# Patient Record
Sex: FEMALE | Race: WHITE | Employment: PART TIME | URBAN - METROPOLITAN AREA
[De-identification: names, ages, dates, MRNs, and addresses within clinical notes are randomized per-mention and may not be internally consistent; named-entity substitution may affect disease eponyms.]

---

## 2017-01-24 ENCOUNTER — OFFICE VISIT (OUTPATIENT)
Dept: INTERNAL MEDICINE CLINIC | Age: 67
End: 2017-01-24

## 2017-01-24 VITALS — HEART RATE: 80 BPM | SYSTOLIC BLOOD PRESSURE: 118 MMHG | DIASTOLIC BLOOD PRESSURE: 74 MMHG

## 2017-01-24 DIAGNOSIS — F32.A ANXIETY AND DEPRESSION: Primary | ICD-10-CM

## 2017-01-24 DIAGNOSIS — F41.9 ANXIETY AND DEPRESSION: Primary | ICD-10-CM

## 2017-01-24 PROCEDURE — 99214 OFFICE O/P EST MOD 30 MIN: CPT | Performed by: INTERNAL MEDICINE

## 2017-01-24 ASSESSMENT — PATIENT HEALTH QUESTIONNAIRE - PHQ9
1. LITTLE INTEREST OR PLEASURE IN DOING THINGS: 1
SUM OF ALL RESPONSES TO PHQ QUESTIONS 1-9: 2
SUM OF ALL RESPONSES TO PHQ9 QUESTIONS 1 & 2: 2
2. FEELING DOWN, DEPRESSED OR HOPELESS: 1

## 2017-03-15 ENCOUNTER — OFFICE VISIT (OUTPATIENT)
Dept: INTERNAL MEDICINE CLINIC | Age: 67
End: 2017-03-15

## 2017-03-15 VITALS — HEART RATE: 70 BPM | DIASTOLIC BLOOD PRESSURE: 66 MMHG | SYSTOLIC BLOOD PRESSURE: 106 MMHG | OXYGEN SATURATION: 96 %

## 2017-03-15 DIAGNOSIS — D69.6 THROMBOCYTOPENIA (HCC): ICD-10-CM

## 2017-03-15 DIAGNOSIS — F32.A ANXIETY AND DEPRESSION: Primary | ICD-10-CM

## 2017-03-15 DIAGNOSIS — E55.9 VITAMIN D DEFICIENCY: ICD-10-CM

## 2017-03-15 DIAGNOSIS — F41.9 ANXIETY AND DEPRESSION: Primary | ICD-10-CM

## 2017-03-15 DIAGNOSIS — E11.9 TYPE 2 DIABETES MELLITUS WITHOUT COMPLICATION, WITHOUT LONG-TERM CURRENT USE OF INSULIN (HCC): ICD-10-CM

## 2017-03-15 LAB
ANION GAP SERPL CALCULATED.3IONS-SCNC: 19 MMOL/L (ref 3–16)
BUN BLDV-MCNC: 19 MG/DL (ref 7–20)
CALCIUM SERPL-MCNC: 10.5 MG/DL (ref 8.3–10.6)
CHLORIDE BLD-SCNC: 96 MMOL/L (ref 99–110)
CO2: 24 MMOL/L (ref 21–32)
CREAT SERPL-MCNC: 0.5 MG/DL (ref 0.6–1.2)
GFR AFRICAN AMERICAN: >60
GFR NON-AFRICAN AMERICAN: >60
GLUCOSE BLD-MCNC: 102 MG/DL (ref 70–99)
POTASSIUM SERPL-SCNC: 4.4 MMOL/L (ref 3.5–5.1)
SODIUM BLD-SCNC: 139 MMOL/L (ref 136–145)

## 2017-03-15 PROCEDURE — G0009 ADMIN PNEUMOCOCCAL VACCINE: HCPCS | Performed by: INTERNAL MEDICINE

## 2017-03-15 PROCEDURE — 90670 PCV13 VACCINE IM: CPT | Performed by: INTERNAL MEDICINE

## 2017-03-15 PROCEDURE — 99213 OFFICE O/P EST LOW 20 MIN: CPT | Performed by: INTERNAL MEDICINE

## 2017-03-16 LAB
ESTIMATED AVERAGE GLUCOSE: 148.5 MG/DL
HBA1C MFR BLD: 6.8 %
HCT VFR BLD CALC: 43.7 % (ref 36–48)
HEMOGLOBIN: 14.5 G/DL (ref 12–16)
MCH RBC QN AUTO: 29.3 PG (ref 26–34)
MCHC RBC AUTO-ENTMCNC: 33.1 G/DL (ref 31–36)
MCV RBC AUTO: 88.5 FL (ref 80–100)
PDW BLD-RTO: 13.7 % (ref 12.4–15.4)
PLATELET # BLD: 117 K/UL (ref 135–450)
PLATELET SLIDE REVIEW: ABNORMAL
PMV BLD AUTO: 13 FL (ref 5–10.5)
RBC # BLD: 4.94 M/UL (ref 4–5.2)
VITAMIN D 25-HYDROXY: 31.9 NG/ML
WBC # BLD: 7.8 K/UL (ref 4–11)

## 2017-05-12 ENCOUNTER — OFFICE VISIT (OUTPATIENT)
Dept: INTERNAL MEDICINE CLINIC | Age: 67
End: 2017-05-12

## 2017-05-12 VITALS
SYSTOLIC BLOOD PRESSURE: 114 MMHG | HEART RATE: 74 BPM | OXYGEN SATURATION: 93 % | BODY MASS INDEX: 27.81 KG/M2 | WEIGHT: 155 LBS | DIASTOLIC BLOOD PRESSURE: 72 MMHG

## 2017-05-12 DIAGNOSIS — M79.18 PAIN IN DELTOID, BILATERAL: Primary | ICD-10-CM

## 2017-05-12 PROCEDURE — 99214 OFFICE O/P EST MOD 30 MIN: CPT | Performed by: INTERNAL MEDICINE

## 2017-05-12 RX ORDER — NICOTINE POLACRILEX 4 MG/1
20 GUM, CHEWING ORAL DAILY
Qty: 30 TABLET | Refills: 5 | Status: SHIPPED | OUTPATIENT
Start: 2017-05-12

## 2017-05-15 DIAGNOSIS — M79.18 PAIN IN DELTOID, BILATERAL: ICD-10-CM

## 2017-05-15 DIAGNOSIS — D69.6 THROMBOCYTOPENIA (HCC): ICD-10-CM

## 2017-05-15 LAB
BASOPHILS ABSOLUTE: 0 K/UL (ref 0–0.2)
BASOPHILS RELATIVE PERCENT: 0.5 %
C-REACTIVE PROTEIN: 5.2 MG/L (ref 0–5.1)
EOSINOPHILS ABSOLUTE: 0.1 K/UL (ref 0–0.6)
EOSINOPHILS RELATIVE PERCENT: 1.5 %
HCT VFR BLD CALC: 42.5 % (ref 36–48)
HEMOGLOBIN: 13.7 G/DL (ref 12–16)
LYMPHOCYTES ABSOLUTE: 1.3 K/UL (ref 1–5.1)
LYMPHOCYTES RELATIVE PERCENT: 18.1 %
MCH RBC QN AUTO: 28.7 PG (ref 26–34)
MCHC RBC AUTO-ENTMCNC: 32.3 G/DL (ref 31–36)
MCV RBC AUTO: 88.7 FL (ref 80–100)
MONOCYTES ABSOLUTE: 0.5 K/UL (ref 0–1.3)
MONOCYTES RELATIVE PERCENT: 6.6 %
NEUTROPHILS ABSOLUTE: 5.3 K/UL (ref 1.7–7.7)
NEUTROPHILS RELATIVE PERCENT: 73.3 %
PDW BLD-RTO: 14.3 % (ref 12.4–15.4)
PLATELET # BLD: 110 K/UL (ref 135–450)
PMV BLD AUTO: 13.3 FL (ref 5–10.5)
RBC # BLD: 4.79 M/UL (ref 4–5.2)
SEDIMENTATION RATE, ERYTHROCYTE: 7 MM/HR (ref 0–30)
TOTAL CK: 102 U/L (ref 26–192)
WBC # BLD: 7.2 K/UL (ref 4–11)

## 2017-05-17 ENCOUNTER — TELEPHONE (OUTPATIENT)
Dept: INTERNAL MEDICINE CLINIC | Age: 67
End: 2017-05-17

## 2017-05-17 DIAGNOSIS — M25.511 BILATERAL SHOULDER PAIN, UNSPECIFIED CHRONICITY: Primary | ICD-10-CM

## 2017-05-17 DIAGNOSIS — M25.512 BILATERAL SHOULDER PAIN, UNSPECIFIED CHRONICITY: Primary | ICD-10-CM

## 2017-05-23 RX ORDER — METHYLPREDNISOLONE 4 MG/1
TABLET ORAL
Qty: 1 KIT | Refills: 0 | Status: SHIPPED | OUTPATIENT
Start: 2017-05-23 | End: 2017-10-11 | Stop reason: ALTCHOICE

## 2017-06-01 RX ORDER — ERGOCALCIFEROL 1.25 MG/1
CAPSULE ORAL
Qty: 4 CAPSULE | Refills: 4 | Status: SHIPPED | OUTPATIENT
Start: 2017-06-01 | End: 2018-01-22 | Stop reason: SDUPTHER

## 2017-06-02 RX ORDER — ESZOPICLONE 3 MG/1
TABLET, FILM COATED ORAL
Qty: 30 TABLET | Refills: 0 | Status: SHIPPED | OUTPATIENT
Start: 2017-06-02 | End: 2017-07-03 | Stop reason: SDUPTHER

## 2017-06-02 RX ORDER — PRAVASTATIN SODIUM 40 MG
TABLET ORAL
Qty: 90 TABLET | Refills: 0 | Status: SHIPPED | OUTPATIENT
Start: 2017-06-02 | End: 2017-09-11 | Stop reason: SDUPTHER

## 2017-06-14 ENCOUNTER — OFFICE VISIT (OUTPATIENT)
Dept: ORTHOPEDIC SURGERY | Age: 67
End: 2017-06-14

## 2017-06-14 VITALS
HEIGHT: 63 IN | SYSTOLIC BLOOD PRESSURE: 113 MMHG | WEIGHT: 155 LBS | DIASTOLIC BLOOD PRESSURE: 72 MMHG | BODY MASS INDEX: 27.46 KG/M2 | HEART RATE: 68 BPM

## 2017-06-14 DIAGNOSIS — M75.81 ROTATOR CUFF TENDINITIS, RIGHT: Primary | ICD-10-CM

## 2017-06-14 DIAGNOSIS — M25.511 ACUTE PAIN OF RIGHT SHOULDER: ICD-10-CM

## 2017-06-14 DIAGNOSIS — M75.82 ROTATOR CUFF TENDINITIS, LEFT: ICD-10-CM

## 2017-06-14 PROCEDURE — 73030 X-RAY EXAM OF SHOULDER: CPT | Performed by: ORTHOPAEDIC SURGERY

## 2017-06-14 PROCEDURE — 99203 OFFICE O/P NEW LOW 30 MIN: CPT | Performed by: ORTHOPAEDIC SURGERY

## 2017-06-15 ENCOUNTER — HOSPITAL ENCOUNTER (OUTPATIENT)
Dept: PHYSICAL THERAPY | Age: 67
Discharge: HOME | End: 2017-06-30
Attending: ORTHOPAEDIC SURGERY | Admitting: ORTHOPAEDIC SURGERY

## 2017-07-03 RX ORDER — ESZOPICLONE 3 MG/1
TABLET, FILM COATED ORAL
Qty: 30 TABLET | Refills: 0 | Status: SHIPPED | OUTPATIENT
Start: 2017-07-03 | End: 2017-08-02 | Stop reason: SDUPTHER

## 2017-07-10 ENCOUNTER — TELEPHONE (OUTPATIENT)
Dept: INTERNAL MEDICINE CLINIC | Age: 67
End: 2017-07-10

## 2017-07-28 ENCOUNTER — TELEPHONE (OUTPATIENT)
Dept: INTERNAL MEDICINE CLINIC | Age: 67
End: 2017-07-28

## 2017-07-28 RX ORDER — MELOXICAM 15 MG/1
TABLET ORAL
Qty: 45 TABLET | Refills: 0 | Status: SHIPPED | OUTPATIENT
Start: 2017-07-28 | End: 2018-04-10 | Stop reason: ALTCHOICE

## 2017-07-28 RX ORDER — MELOXICAM 15 MG/1
7.5 TABLET ORAL
Qty: 30 TABLET | Refills: 0 | Status: SHIPPED | OUTPATIENT
Start: 2017-07-28 | End: 2017-07-28 | Stop reason: SDUPTHER

## 2017-08-02 RX ORDER — ESZOPICLONE 3 MG/1
TABLET, FILM COATED ORAL
Qty: 30 TABLET | Refills: 0 | Status: SHIPPED | OUTPATIENT
Start: 2017-08-02 | End: 2017-08-30 | Stop reason: SDUPTHER

## 2017-08-30 ENCOUNTER — TELEPHONE (OUTPATIENT)
Dept: INTERNAL MEDICINE CLINIC | Age: 67
End: 2017-08-30

## 2017-08-30 RX ORDER — ESZOPICLONE 3 MG/1
TABLET, FILM COATED ORAL
Qty: 30 TABLET | Refills: 0 | Status: SHIPPED | OUTPATIENT
Start: 2017-08-30 | End: 2017-09-05 | Stop reason: SDUPTHER

## 2017-09-05 ENCOUNTER — TELEPHONE (OUTPATIENT)
Dept: INTERNAL MEDICINE CLINIC | Age: 67
End: 2017-09-05

## 2017-09-05 RX ORDER — LISINOPRIL AND HYDROCHLOROTHIAZIDE 12.5; 1 MG/1; MG/1
TABLET ORAL
Qty: 90 TABLET | Refills: 0 | Status: SHIPPED | OUTPATIENT
Start: 2017-09-05 | End: 2018-04-10 | Stop reason: CLARIF

## 2017-09-05 RX ORDER — ESZOPICLONE 3 MG/1
TABLET, FILM COATED ORAL
Qty: 30 TABLET | Refills: 0 | Status: SHIPPED | OUTPATIENT
Start: 2017-09-05 | End: 2017-10-01 | Stop reason: SDUPTHER

## 2017-09-05 RX ORDER — SITAGLIPTIN 100 MG/1
TABLET, FILM COATED ORAL
Qty: 30 TABLET | Refills: 0 | Status: SHIPPED | OUTPATIENT
Start: 2017-09-05 | End: 2017-10-01 | Stop reason: SDUPTHER

## 2017-10-02 RX ORDER — ESZOPICLONE 3 MG/1
TABLET, FILM COATED ORAL
Qty: 30 TABLET | Refills: 0 | Status: SHIPPED | OUTPATIENT
Start: 2017-10-02 | End: 2017-10-30 | Stop reason: SDUPTHER

## 2017-10-02 RX ORDER — SITAGLIPTIN 100 MG/1
TABLET, FILM COATED ORAL
Qty: 30 TABLET | Refills: 0 | Status: SHIPPED | OUTPATIENT
Start: 2017-10-02 | End: 2017-10-30 | Stop reason: SDUPTHER

## 2017-10-11 ENCOUNTER — OFFICE VISIT (OUTPATIENT)
Dept: INTERNAL MEDICINE CLINIC | Age: 67
End: 2017-10-11

## 2017-10-11 VITALS
WEIGHT: 155 LBS | DIASTOLIC BLOOD PRESSURE: 71 MMHG | HEART RATE: 59 BPM | OXYGEN SATURATION: 95 % | SYSTOLIC BLOOD PRESSURE: 109 MMHG | BODY MASS INDEX: 27.46 KG/M2

## 2017-10-11 DIAGNOSIS — G57.62 MORTON'S NEUROMA, LEFT: ICD-10-CM

## 2017-10-11 DIAGNOSIS — H26.9 CATARACT OF BOTH EYES, UNSPECIFIED CATARACT TYPE: Primary | ICD-10-CM

## 2017-10-11 DIAGNOSIS — E11.9 TYPE 2 DIABETES MELLITUS WITHOUT COMPLICATION, WITHOUT LONG-TERM CURRENT USE OF INSULIN (HCC): ICD-10-CM

## 2017-10-11 DIAGNOSIS — E78.2 MIXED HYPERLIPIDEMIA: ICD-10-CM

## 2017-10-11 PROBLEM — I10 ESSENTIAL HYPERTENSION: Status: ACTIVE | Noted: 2017-10-11

## 2017-10-11 LAB — HBA1C MFR BLD: 6.7 %

## 2017-10-11 PROCEDURE — 99214 OFFICE O/P EST MOD 30 MIN: CPT | Performed by: INTERNAL MEDICINE

## 2017-10-11 PROCEDURE — 90662 IIV NO PRSV INCREASED AG IM: CPT | Performed by: INTERNAL MEDICINE

## 2017-10-11 PROCEDURE — 83036 HEMOGLOBIN GLYCOSYLATED A1C: CPT | Performed by: INTERNAL MEDICINE

## 2017-10-11 PROCEDURE — 90471 IMMUNIZATION ADMIN: CPT | Performed by: INTERNAL MEDICINE

## 2017-10-11 NOTE — PATIENT INSTRUCTIONS
Instead, wear roomy footwear. · Put ice or a cold pack on the area for 10 to 20 minutes at a time. Put a thin cloth between the ice and your skin. · Try massaging your feet. This relaxes the muscles around the nerve. · If your doctor prescribed special pads or a device to relieve pressure on your toes, use these items as directed. · Until all pain and swelling go away, avoid activities that put pressure on the toes. These include racquet sports and running. When should you call for help? Watch closely for changes in your health, and be sure to contact your doctor if:  · You do not get better as expected. Where can you learn more? Go to https://[employer] org and sign in to your [employer] account. Enter E100 in the [employer] box to learn more about \"[facility]: Care Instructions. \"     If you do not have an account, please click on the \"Sign Up Now\" link. Current as of: March 21, 2017  Content Version: 11.3  © 3428-9152 [employer]. Care instructions adapted under license by [facility] ([facility]). If you have questions about a medical condition or this instruction, always ask your healthcare professional. [place] [address] any warranty or liability for your use of this information.

## 2017-10-11 NOTE — PROGRESS NOTES
[facility]- Internal Medicine  Preoperative Consultation  Anne Malone MD    10/11/2017    Marisol Rendon  :  1950    Chief Complaint   Patient presents with    Pre-op Exam     Patient is having cataract surgery on 10/31 and . Due for dexa. HPI:  This patient presents to the office today for a preoperative consultation at the request of Dr. Valeriy Sharma to assess stability of patient's medical condition(s) listed below. She will be having cataract  surgery 10/31 and . Cataracts now causing functional impairment and ready for removal.    Diabetes: A1c today is 6.7. Doesn't monitor. Also wonders how long needs to take lisinopril-hctz. Was started 3-4 years ago. Hyperlipidemia: on pravastatin with no side effects. No exertional chest pain, pressure or dyspnea. Foot pain in left metatarsals, 2nd-3rd. Burning pain that goes into toes. Saw Dr. Viktoriya Oliva (podiatrist) for different foot pain in past and told her was arthritis. Has seen Dr. Ambar Mondragon in the past as well before second bunion surgery. Not taking anything for pain. Still with shoulder pain. Saw ortho and also PT. No stiffness. Mostly with movement. Seeing ortho when back from cruise    ROS  Known anesthesia problems: None   Bleeding risk: No recent or remote history of abnormal bleeding  Personal or FH of DVT/PE: No    Recent steroid use: no  Exercise tolerance: good, walks regularly up to 1 1/2 miles. Past Medical History:   Diagnosis Date    Cervical spondylosis     MRI , marked to severe degenerative disease    Gastroesophageal reflux disease without esophagitis 2016    Longstanding prilosec use.  EGD approx     H/O adenomatous polyp of colon 2015    rectal and sigmoid    Insomnia 2016    Mixed hyperlipidemia 2016    Osteopenia 2016    Type 2 diabetes mellitus ([facility] [address] 2016     Past Surgical History:   Procedure Laterality Date    BREAST REDUCTION SURGERY  appro    

## 2017-10-25 ENCOUNTER — TELEPHONE (OUTPATIENT)
Dept: INTERNAL MEDICINE CLINIC | Age: 67
End: 2017-10-25

## 2017-10-25 NOTE — TELEPHONE ENCOUNTER
Angelica left message on non emergency line. She wants to know if pt had a Preop done for her eye surgery on 10-31.   If so please fax H&P to [phone]

## 2017-11-15 ENCOUNTER — OFFICE VISIT (OUTPATIENT)
Dept: ORTHOPEDIC SURGERY | Age: 67
End: 2017-11-15

## 2017-11-15 VITALS
SYSTOLIC BLOOD PRESSURE: 124 MMHG | HEIGHT: 64 IN | HEART RATE: 63 BPM | WEIGHT: 156 LBS | DIASTOLIC BLOOD PRESSURE: 78 MMHG | BODY MASS INDEX: 26.63 KG/M2

## 2017-11-15 DIAGNOSIS — M67.911 DISORDER OF ROTATOR CUFF, RIGHT: Primary | ICD-10-CM

## 2017-11-15 DIAGNOSIS — M25.511 ACUTE PAIN OF RIGHT SHOULDER: ICD-10-CM

## 2017-11-15 PROCEDURE — 99213 OFFICE O/P EST LOW 20 MIN: CPT | Performed by: ORTHOPAEDIC SURGERY

## 2017-11-15 RX ORDER — MELOXICAM 15 MG/1
15 TABLET ORAL DAILY
Qty: 30 TABLET | Refills: 2 | Status: SHIPPED | OUTPATIENT
Start: 2017-11-15 | End: 2017-11-15 | Stop reason: SDUPTHER

## 2017-11-16 RX ORDER — MELOXICAM 15 MG/1
15 TABLET ORAL DAILY
Qty: 90 TABLET | Refills: 2 | Status: SHIPPED | OUTPATIENT
Start: 2017-11-16 | End: 2018-04-10 | Stop reason: CLARIF

## 2017-11-16 NOTE — PROGRESS NOTES
History of Present Illness:  [facility] for follow-up of her shoulders, in particularly her right shoulder. I saw her back in June and diagnosed rotator cuff tendinosis bilaterally. We started her on meloxicam 15 mg. We also recommended physical therapy. We did not give her a corticosteroid injection. She took the meloxicam a while, but her primary care doctor, Dr. Matt Che, recommended that she not staying on that long-term. She had 3 or 4 sessions with the physical therapist but really didn't follow-up. She found that the therapy made things worse and was quite painful. Over the last few months she's been trying to live with it. Recently the pain worsened. Medical History:  Patient's medications, allergies, past medical, surgical, social and family histories were reviewed and updated as appropriate. Pertinent items are noted in HPI  Review of systems reviewed from Patient History Form dated on 1/14/2017 and available in the patient's chart under the Media tab. Vital Signs:  Vitals:    11/15/17 1634   BP: 124/78   Pulse: 63     Constitutional: She is in no apparent distress, and she appears younger than her stated age. Right Shoulder Examination:    Inspection:  Right shoulder is benign appearing. No rotator cuff or deltoid atrophy. Palpation:  There is tenderness over the rotator cuff and no obvious crepitus. The left is essentially nontender. Active Range of Motion: Active range of motion is well-preserved but painful at the end range. Internal rotation to the back to T12. Passive Range of Motion:  Slight deficits at the end range. Strength:  She has good external rotation strength But it is weaker on the Left side. 4 out of 5 on the left and 4+ over 5 on the right. Supraspinatus strength is 4+ over 5, bilaterally. Special Tests: On the right side impingement tests are positive including Job. Neer. Lyndal Hamman is essentially negative.   She does have

## 2017-11-27 RX ORDER — ESZOPICLONE 3 MG/1
TABLET, FILM COATED ORAL
Qty: 30 TABLET | Refills: 0 | Status: SHIPPED | OUTPATIENT
Start: 2017-11-27 | End: 2017-12-26 | Stop reason: SDUPTHER

## 2017-11-29 ENCOUNTER — OFFICE VISIT (OUTPATIENT)
Dept: ORTHOPEDIC SURGERY | Age: 67
End: 2017-11-29

## 2017-11-29 VITALS
HEIGHT: 64 IN | SYSTOLIC BLOOD PRESSURE: 134 MMHG | WEIGHT: 156.09 LBS | BODY MASS INDEX: 26.65 KG/M2 | HEART RATE: 66 BPM | DIASTOLIC BLOOD PRESSURE: 85 MMHG

## 2017-11-29 DIAGNOSIS — M75.21 BICEPS TENDONITIS ON RIGHT: ICD-10-CM

## 2017-11-29 DIAGNOSIS — M75.111 PARTIAL NONTRAUMATIC TEAR OF RIGHT ROTATOR CUFF: Primary | ICD-10-CM

## 2017-11-29 DIAGNOSIS — M25.511 RIGHT SHOULDER PAIN, UNSPECIFIED CHRONICITY: ICD-10-CM

## 2017-11-29 PROCEDURE — 99213 OFFICE O/P EST LOW 20 MIN: CPT | Performed by: ORTHOPAEDIC SURGERY

## 2017-11-29 PROCEDURE — 20610 DRAIN/INJ JOINT/BURSA W/O US: CPT | Performed by: ORTHOPAEDIC SURGERY

## 2017-11-29 NOTE — PROGRESS NOTES
2cc's-Depo  Lot# : [id number]  NDC: 5700-9559-81  Exp: 04/2020    Injection site: RIGHT SHOULDER    8cc's-Lido  Lot#:  [id number]  B:[id number]  Exp: 01/2019    Injection site: RIGHT SHOULDER

## 2017-11-29 NOTE — PROGRESS NOTES
Hilda of Present Illness:  Anel Penaloza is a 70-year-old female who returns for follow-up of her right shoulder. We diagnosed her with rotator cuff tendinitis and started her on physical therapy and meloxicam.  She started physical therapy but found that her pain worsened immediately. Since her last visit she continues to have discomfort with overhead movements and pain when she brings her arm down. She has trouble lifting objects overhead. She denies any new injuries. At her last visit we also recommended obtaining an MRI of the shoulder. She underwent the study and would like to review the MRI results with me. Medical History:  Patient's medications, allergies, past medical, surgical, social and family histories were reviewed and updated as appropriate. 2cc's-Depo  Lot# : [id number]  NDC: 8344-3176-87  Exp: 04/2020    Injection site: RIGHT SHOULDER    8cc's-Lido  Lot#:  [id number]  NDC:6741-3994-30  Exp: 01/2019    Injection site: RIGHT SHOULDER      Review of Systems  A 14 point review of systems was completed by the patient on 1/14/17 and is available in the media section of the scanned medical record and was reviewed on 11/29/2017. The review is negative with the exception of those things mentioned in the HPI and Past Medical History    Vital Signs:  Vitals:    11/29/17 0937   BP: 134/85   Pulse: 66       General/Appearance: Alert and oriented and in no apparent distress. Skin:  There are no skin lesions, cellulitis, or extreme edema. The patient has warm and well-perfused Bilateral upper extremities with brisk capillary refill. Right Shoulder Exam:  Inspection:  No gross deformities, no signs of infection. Palpation:  She has tenderness over the rotator cuff, she has tenderness over the bicipital groove and a.c. joint. She has no posterior joint tenderness.     Active Range of Motion: She does have a painful arc and pain on arm descent, forward elevation 150°, abduction of 145°, MICHAEL  Orthopaedic Sports Medicine Physician Assistant    During this examination, Joe AGUILAR PA-C, functioned as a scribe for Dr. Danii Nash. This dictation was performed with a verbal recognition program (DRAGON) and it was checked for errors. It is possible that there are still dictated errors within this office note. If so, please bring any errors to my attention for an addendum. All efforts were made to ensure that this office note is accurate.  _______  I, Dr. Danii Nash, personally performed the services described in this documentation as described by Joe Hernández PA-C in my presence, and it is both accurate and complete. Anette Pruitt MD, PhD  11/29/2017

## 2017-11-29 NOTE — LETTER
Physical Therapy Rehabilitation Referral    Patient Name:  Torie Mckenna      YOB: 1950    Diagnosis:    1. Partial nontraumatic tear of right rotator cuff    2. Biceps tendonitis on right    3. Right shoulder pain, unspecified chronicity        Precautions:      Evaluate and Treat    Post Op Instructions:   Continuous passive motion (CPM)  Elbow ROM   Exercise in plane of scapula    Strengthening      Pulley and instruction    Home exercise program (copy to patient)    Sling when arm at risk   Sling or brace at all times    AAROM: Forward elevation to  140             AAROM: External rotation  To  40     Isometric external rotator strengthening  AAROM: internal rotation: up the back   Isometric abductor strengthening   AAROM: Internal abduction    Isometric internal rotator strengthening  AAROM: cross-body adduction             Stretching:     Strengthening:   Four quadrant (FE, ER, IR, CBA)   Rotator cuff (ER, IR, Abd)   Forward Elevation     External Rotators      External Rotation     Internal Rotators   Internal Rotation: up/back    Abductors      Internal Rotation: supine in abduction  Flexors   Cross-body abduction     Extensors   Pendulum (FE, Abd/Add, cw/ccw)   Scapular Stabilizers    Wall-walking (FE, Abd)         Shoulder shrugs      Table slides (FE)                 Rhomboid pinch   Elbow (flex, ext, pron, sup)         Lat.  Pull downs      Medial epicondylitis program        Forward punch    Lateral epicondylitis program        Internal rotators      Progressive resistive exercises   Bench Press         Bench press plus  Activities:      Lateral pull-downs   Rowing      Progressive two-hand supine press   Stepper/Exercise bike    Biceps: curls/supination   Swimming   Water exercises    Modalities:     Return to Sport:   Of Choice       Plyometrics   Ultrasound      Rhythmic stabilization   Iontophoresis     Core strengthening    Moist heat      Sports specific program:

## 2017-12-13 RX ORDER — PRAVASTATIN SODIUM 40 MG
TABLET ORAL
Qty: 90 TABLET | Refills: 0 | Status: SHIPPED | OUTPATIENT
Start: 2017-12-13 | End: 2018-03-18 | Stop reason: SDUPTHER

## 2017-12-28 RX ORDER — SITAGLIPTIN 100 MG/1
TABLET, FILM COATED ORAL
Qty: 30 TABLET | Refills: 5 | Status: SHIPPED | OUTPATIENT
Start: 2017-12-28 | End: 2018-04-10 | Stop reason: SDUPTHER

## 2018-01-22 RX ORDER — ERGOCALCIFEROL 1.25 MG/1
CAPSULE ORAL
Qty: 4 CAPSULE | Refills: 1 | Status: SHIPPED | OUTPATIENT
Start: 2018-01-22 | End: 2018-04-11 | Stop reason: ALTCHOICE

## 2018-01-24 ENCOUNTER — OFFICE VISIT (OUTPATIENT)
Dept: ORTHOPEDIC SURGERY | Age: 68
End: 2018-01-24

## 2018-01-24 VITALS
HEART RATE: 82 BPM | WEIGHT: 157 LBS | HEIGHT: 64 IN | SYSTOLIC BLOOD PRESSURE: 120 MMHG | DIASTOLIC BLOOD PRESSURE: 76 MMHG | BODY MASS INDEX: 26.8 KG/M2

## 2018-01-24 DIAGNOSIS — M75.121 COMPLETE TEAR OF RIGHT ROTATOR CUFF: Primary | ICD-10-CM

## 2018-01-24 PROCEDURE — 99213 OFFICE O/P EST LOW 20 MIN: CPT | Performed by: ORTHOPAEDIC SURGERY

## 2018-01-24 NOTE — PROGRESS NOTES
History of Present Illness:  Shira Villagomez is a 77-year-old female who returns for follow-up of her right shoulder. She previously been diagnosed with rotator cuff tendinitis and started oral anti-inflammatory medication. An MRI showed a full-thickness tear, she most recently underwent a corticosteroid injection about 7 weeks ago. She states she maybe got about 10 days of relief from it and then the pain returned. She is disappointed about this. She states her pain is still bothering her most at all times. It is awakening her at night. She is not doing as much therapy as of late because her mother has been transferred to hospice and she's been doing without most of the time. Medical History:  Patient's medications, allergies, past medical, surgical, social and family histories were reviewed and updated as appropriate. No notes on file    Review of Systems  A 14 point review of systems was completed by the patient on 1/14/17 and is available in the media section of the scanned medical record and was reviewed on 1/24/2018. The review is negative with the exception of those things mentioned in the HPI and Past Medical History    Vital Signs:  Vitals:    01/24/18 0854   BP: 120/76   Pulse: 82       General/Appearance: Alert and oriented and in no apparent distress. Skin:  There are no skin lesions, cellulitis, or extreme edema. The patient has warm and well-perfused Bilateral upper extremities with brisk capillary refill. Right Shoulder Exam:  Inspection:  No gross deformities, no signs of infection. Palpation:  She has tenderness over the rotator cuff, She has minimal tenderness over the biceps tendon and the acromial clavicular joint. Active Range of Motion: She does have a painful arc and pain on arm descent, forward elevation 150°, abduction of 145°, external rotation 45° and internal rotation to the back is at T8.     Strength:  +4/5 internal rotation with resistance, -4/5 external

## 2018-03-22 RX ORDER — PRAVASTATIN SODIUM 40 MG
TABLET ORAL
Qty: 30 TABLET | Refills: 0 | Status: SHIPPED | OUTPATIENT
Start: 2018-03-22 | End: 2018-04-10 | Stop reason: SDUPTHER

## 2018-04-02 DIAGNOSIS — F51.04 CHRONIC INSOMNIA: ICD-10-CM

## 2018-04-02 RX ORDER — ESZOPICLONE 3 MG/1
3 TABLET, FILM COATED ORAL NIGHTLY PRN
Qty: 30 TABLET | Refills: 0 | Status: SHIPPED | OUTPATIENT
Start: 2018-04-02 | End: 2018-05-01 | Stop reason: SDUPTHER

## 2018-04-03 DIAGNOSIS — F51.04 CHRONIC INSOMNIA: ICD-10-CM

## 2018-04-03 RX ORDER — ESZOPICLONE 3 MG/1
TABLET, FILM COATED ORAL
Qty: 30 TABLET | Refills: 0 | OUTPATIENT
Start: 2018-04-03

## 2018-04-09 ENCOUNTER — OFFICE VISIT (OUTPATIENT)
Dept: ORTHOPEDIC SURGERY | Age: 68
End: 2018-04-09

## 2018-04-09 VITALS — BODY MASS INDEX: 26.8 KG/M2 | WEIGHT: 156.97 LBS | HEIGHT: 64 IN

## 2018-04-09 DIAGNOSIS — M25.512 ACUTE PAIN OF LEFT SHOULDER: ICD-10-CM

## 2018-04-09 DIAGNOSIS — M54.2 NECK PAIN: Primary | ICD-10-CM

## 2018-04-09 PROCEDURE — 99215 OFFICE O/P EST HI 40 MIN: CPT | Performed by: ORTHOPAEDIC SURGERY

## 2018-04-09 RX ORDER — PREDNISONE 10 MG/1
TABLET ORAL
Qty: 30 TABLET | Refills: 0 | Status: SHIPPED | OUTPATIENT
Start: 2018-04-09 | End: 2018-06-08

## 2018-04-10 ENCOUNTER — OFFICE VISIT (OUTPATIENT)
Dept: INTERNAL MEDICINE CLINIC | Age: 68
End: 2018-04-10

## 2018-04-10 VITALS
BODY MASS INDEX: 27.79 KG/M2 | WEIGHT: 162 LBS | HEART RATE: 64 BPM | SYSTOLIC BLOOD PRESSURE: 122 MMHG | DIASTOLIC BLOOD PRESSURE: 64 MMHG

## 2018-04-10 DIAGNOSIS — E55.9 VITAMIN D DEFICIENCY: ICD-10-CM

## 2018-04-10 DIAGNOSIS — E78.2 MIXED HYPERLIPIDEMIA: ICD-10-CM

## 2018-04-10 DIAGNOSIS — F32.A ANXIETY AND DEPRESSION: ICD-10-CM

## 2018-04-10 DIAGNOSIS — F51.04 CHRONIC INSOMNIA: ICD-10-CM

## 2018-04-10 DIAGNOSIS — E11.9 TYPE 2 DIABETES MELLITUS WITHOUT COMPLICATION, WITHOUT LONG-TERM CURRENT USE OF INSULIN (HCC): Primary | ICD-10-CM

## 2018-04-10 DIAGNOSIS — F41.9 ANXIETY AND DEPRESSION: ICD-10-CM

## 2018-04-10 DIAGNOSIS — Z11.59 NEED FOR HEPATITIS C SCREENING TEST: ICD-10-CM

## 2018-04-10 LAB
CREATININE URINE: 144.7 MG/DL (ref 28–259)
HBA1C MFR BLD: 6.7 %
MICROALBUMIN UR-MCNC: 4.1 MG/DL
MICROALBUMIN/CREAT UR-RTO: 28.3 MG/G (ref 0–30)

## 2018-04-10 PROCEDURE — 99214 OFFICE O/P EST MOD 30 MIN: CPT | Performed by: INTERNAL MEDICINE

## 2018-04-10 PROCEDURE — 83036 HEMOGLOBIN GLYCOSYLATED A1C: CPT | Performed by: INTERNAL MEDICINE

## 2018-04-10 RX ORDER — ESZOPICLONE 3 MG/1
3 TABLET, FILM COATED ORAL NIGHTLY PRN
Qty: 30 TABLET | Refills: 0 | Status: CANCELLED | OUTPATIENT
Start: 2018-04-10 | End: 2018-05-10

## 2018-04-10 RX ORDER — PRAVASTATIN SODIUM 40 MG
40 TABLET ORAL DAILY
Qty: 30 TABLET | Refills: 5 | Status: SHIPPED | OUTPATIENT
Start: 2018-04-10 | End: 2018-10-25 | Stop reason: SDUPTHER

## 2018-04-10 RX ORDER — ERGOCALCIFEROL 1.25 MG/1
CAPSULE ORAL
Qty: 4 CAPSULE | Refills: 1 | Status: CANCELLED | OUTPATIENT
Start: 2018-04-10

## 2018-04-10 ASSESSMENT — PATIENT HEALTH QUESTIONNAIRE - PHQ9
2. FEELING DOWN, DEPRESSED OR HOPELESS: 1
SUM OF ALL RESPONSES TO PHQ QUESTIONS 1-9: 1
1. LITTLE INTEREST OR PLEASURE IN DOING THINGS: 0
SUM OF ALL RESPONSES TO PHQ9 QUESTIONS 1 & 2: 1

## 2018-04-26 ENCOUNTER — OFFICE VISIT (OUTPATIENT)
Dept: ORTHOPEDIC SURGERY | Age: 68
End: 2018-04-26

## 2018-04-26 VITALS — BODY MASS INDEX: 27.66 KG/M2 | WEIGHT: 162.04 LBS | HEIGHT: 64 IN

## 2018-04-26 DIAGNOSIS — M75.122 COMPLETE TEAR OF LEFT ROTATOR CUFF: Primary | ICD-10-CM

## 2018-04-26 DIAGNOSIS — S43.432A SUPERIOR GLENOID LABRUM LESION OF LEFT SHOULDER, INITIAL ENCOUNTER: ICD-10-CM

## 2018-04-26 PROCEDURE — 99214 OFFICE O/P EST MOD 30 MIN: CPT | Performed by: ORTHOPAEDIC SURGERY

## 2018-04-26 PROCEDURE — L3670 SO ACRO/CLAV CAN WEB PRE OTS: HCPCS | Performed by: ORTHOPAEDIC SURGERY

## 2018-05-01 DIAGNOSIS — F51.04 CHRONIC INSOMNIA: ICD-10-CM

## 2018-05-01 RX ORDER — ESZOPICLONE 3 MG/1
TABLET, FILM COATED ORAL
Qty: 30 TABLET | Refills: 0 | Status: SHIPPED | OUTPATIENT
Start: 2018-05-01 | End: 2018-05-31 | Stop reason: CLARIF

## 2018-05-10 DIAGNOSIS — E11.9 TYPE 2 DIABETES MELLITUS WITHOUT COMPLICATION, WITHOUT LONG-TERM CURRENT USE OF INSULIN (HCC): Primary | ICD-10-CM

## 2018-05-29 ENCOUNTER — PATIENT MESSAGE (OUTPATIENT)
Dept: INTERNAL MEDICINE CLINIC | Age: 68
End: 2018-05-29

## 2018-06-01 ENCOUNTER — TELEPHONE (OUTPATIENT)
Dept: ORTHOPEDIC SURGERY | Age: 68
End: 2018-06-01

## 2018-06-07 ENCOUNTER — OFFICE VISIT (OUTPATIENT)
Dept: ORTHOPEDIC SURGERY | Age: 68
End: 2018-06-07

## 2018-06-07 VITALS — HEIGHT: 64 IN | WEIGHT: 163.14 LBS | BODY MASS INDEX: 27.85 KG/M2

## 2018-06-07 DIAGNOSIS — M75.122 COMPLETE TEAR OF LEFT ROTATOR CUFF: Primary | ICD-10-CM

## 2018-06-07 DIAGNOSIS — S43.432A SUPERIOR GLENOID LABRUM LESION OF LEFT SHOULDER, INITIAL ENCOUNTER: ICD-10-CM

## 2018-06-07 PROCEDURE — 99214 OFFICE O/P EST MOD 30 MIN: CPT | Performed by: ORTHOPAEDIC SURGERY

## 2018-06-11 ENCOUNTER — TELEPHONE (OUTPATIENT)
Dept: ORTHOPEDIC SURGERY | Age: 68
End: 2018-06-11

## 2018-06-13 DIAGNOSIS — M75.122 COMPLETE TEAR OF LEFT ROTATOR CUFF: Primary | ICD-10-CM

## 2018-06-13 RX ORDER — OXYCODONE HYDROCHLORIDE 10 MG/1
10 TABLET ORAL EVERY 8 HOURS PRN
Qty: 21 TABLET | Refills: 0 | Status: SHIPPED | OUTPATIENT
Start: 2018-06-22 | End: 2018-06-29

## 2018-06-13 RX ORDER — MELOXICAM 15 MG/1
15 TABLET ORAL DAILY
Qty: 30 TABLET | Refills: 3 | Status: SHIPPED | OUTPATIENT
Start: 2018-06-22 | End: 2018-12-17 | Stop reason: SDUPTHER

## 2018-06-22 ENCOUNTER — HOSPITAL ENCOUNTER (OUTPATIENT)
Dept: SURGERY | Age: 68
Discharge: HOME | End: 2018-06-22
Attending: ORTHOPAEDIC SURGERY | Admitting: ORTHOPAEDIC SURGERY

## 2018-06-22 VITALS
WEIGHT: 160.9 LBS | OXYGEN SATURATION: 94 % | DIASTOLIC BLOOD PRESSURE: 72 MMHG | RESPIRATION RATE: 16 BRPM | TEMPERATURE: 98 F | BODY MASS INDEX: 27.47 KG/M2 | SYSTOLIC BLOOD PRESSURE: 122 MMHG | HEART RATE: 76 BPM | HEIGHT: 64 IN

## 2018-06-22 LAB
GLUCOSE BLD-MCNC: 133 MG/DL (ref 70–99)
GLUCOSE BLD-MCNC: 206 MG/DL (ref 70–99)
PERFORMED ON: ABNORMAL
PERFORMED ON: ABNORMAL

## 2018-06-22 RX ORDER — HYDROMORPHONE HCL 110MG/55ML
0.5 PATIENT CONTROLLED ANALGESIA SYRINGE INTRAVENOUS EVERY 5 MIN PRN
Status: DISCONTINUED | OUTPATIENT
Start: 2018-06-22 | End: 2018-06-23 | Stop reason: HOSPADM

## 2018-06-22 RX ORDER — DIPHENHYDRAMINE HYDROCHLORIDE 50 MG/ML
12.5 INJECTION INTRAMUSCULAR; INTRAVENOUS
Status: ACTIVE | OUTPATIENT
Start: 2018-06-22 | End: 2018-06-22

## 2018-06-22 RX ORDER — OXYCODONE HYDROCHLORIDE AND ACETAMINOPHEN 5; 325 MG/1; MG/1
1 TABLET ORAL PRN
Status: COMPLETED | OUTPATIENT
Start: 2018-06-22 | End: 2018-06-22

## 2018-06-22 RX ORDER — ONDANSETRON 2 MG/ML
4 INJECTION INTRAMUSCULAR; INTRAVENOUS
Status: COMPLETED | OUTPATIENT
Start: 2018-06-22 | End: 2018-06-22

## 2018-06-22 RX ORDER — FENTANYL CITRATE 50 UG/ML
50 INJECTION, SOLUTION INTRAMUSCULAR; INTRAVENOUS EVERY 5 MIN PRN
Status: DISCONTINUED | OUTPATIENT
Start: 2018-06-22 | End: 2018-06-23 | Stop reason: HOSPADM

## 2018-06-22 RX ORDER — MEPERIDINE HYDROCHLORIDE 25 MG/ML
12.5 INJECTION INTRAMUSCULAR; INTRAVENOUS; SUBCUTANEOUS EVERY 5 MIN PRN
Status: DISCONTINUED | OUTPATIENT
Start: 2018-06-22 | End: 2018-06-23 | Stop reason: HOSPADM

## 2018-06-22 RX ORDER — SCOLOPAMINE TRANSDERMAL SYSTEM 1 MG/1
1 PATCH, EXTENDED RELEASE TRANSDERMAL
Status: DISCONTINUED | OUTPATIENT
Start: 2018-06-22 | End: 2018-06-23 | Stop reason: HOSPADM

## 2018-06-22 RX ORDER — OXYCODONE HYDROCHLORIDE AND ACETAMINOPHEN 5; 325 MG/1; MG/1
2 TABLET ORAL PRN
Status: COMPLETED | OUTPATIENT
Start: 2018-06-22 | End: 2018-06-22

## 2018-06-22 RX ORDER — MORPHINE SULFATE 10 MG/ML
1 INJECTION, SOLUTION INTRAMUSCULAR; INTRAVENOUS EVERY 5 MIN PRN
Status: DISCONTINUED | OUTPATIENT
Start: 2018-06-22 | End: 2018-06-23 | Stop reason: HOSPADM

## 2018-06-22 RX ORDER — SODIUM CHLORIDE 0.9 % (FLUSH) 0.9 %
SYRINGE (ML) INJECTION
Status: DISPENSED
Start: 2018-06-22 | End: 2018-06-22

## 2018-06-22 RX ORDER — ONDANSETRON 2 MG/ML
4 INJECTION INTRAMUSCULAR; INTRAVENOUS
Status: DISPENSED | OUTPATIENT
Start: 2018-06-22 | End: 2018-06-22

## 2018-06-22 RX ORDER — SODIUM CHLORIDE 9 MG/ML
INJECTION, SOLUTION INTRAVENOUS CONTINUOUS
Status: DISCONTINUED | OUTPATIENT
Start: 2018-06-22 | End: 2018-06-23 | Stop reason: HOSPADM

## 2018-06-22 RX ORDER — ACETAMINOPHEN 325 MG/1
650 TABLET ORAL EVERY 4 HOURS PRN
Status: DISCONTINUED | OUTPATIENT
Start: 2018-06-22 | End: 2018-06-23 | Stop reason: HOSPADM

## 2018-06-22 RX ORDER — PROMETHAZINE HYDROCHLORIDE 25 MG/ML
INJECTION, SOLUTION INTRAMUSCULAR; INTRAVENOUS
Status: COMPLETED
Start: 2018-06-22 | End: 2018-06-22

## 2018-06-22 RX ORDER — CEFAZOLIN SODIUM 2 G/100ML
2 INJECTION, SOLUTION INTRAVENOUS
Status: COMPLETED | OUTPATIENT
Start: 2018-06-22 | End: 2018-06-22

## 2018-06-22 RX ORDER — PROMETHAZINE HYDROCHLORIDE 25 MG/ML
6.25 INJECTION, SOLUTION INTRAMUSCULAR; INTRAVENOUS ONCE
Status: COMPLETED | OUTPATIENT
Start: 2018-06-22 | End: 2018-06-22

## 2018-06-22 RX ORDER — LABETALOL HYDROCHLORIDE 5 MG/ML
5 INJECTION, SOLUTION INTRAVENOUS EVERY 10 MIN PRN
Status: DISCONTINUED | OUTPATIENT
Start: 2018-06-22 | End: 2018-06-23 | Stop reason: HOSPADM

## 2018-06-22 RX ADMIN — Medication 0.5 MG: at 10:25

## 2018-06-22 RX ADMIN — PROMETHAZINE HYDROCHLORIDE 6.25 MG: 25 INJECTION, SOLUTION INTRAMUSCULAR; INTRAVENOUS at 10:53

## 2018-06-22 RX ADMIN — CEFAZOLIN SODIUM 2 G: 2 INJECTION, SOLUTION INTRAVENOUS at 07:41

## 2018-06-22 RX ADMIN — OXYCODONE HYDROCHLORIDE AND ACETAMINOPHEN 1 TABLET: 5; 325 TABLET ORAL at 11:14

## 2018-06-22 RX ADMIN — SODIUM CHLORIDE: 9 INJECTION, SOLUTION INTRAVENOUS at 07:41

## 2018-06-22 RX ADMIN — Medication 0.5 MG: at 10:42

## 2018-06-22 RX ADMIN — ONDANSETRON 4 MG: 2 INJECTION INTRAMUSCULAR; INTRAVENOUS at 10:23

## 2018-06-22 ASSESSMENT — PAIN DESCRIPTION - LOCATION
LOCATION: SHOULDER

## 2018-06-22 ASSESSMENT — PAIN DESCRIPTION - ORIENTATION
ORIENTATION: LEFT

## 2018-06-22 ASSESSMENT — PAIN SCALES - GENERAL
PAINLEVEL_OUTOF10: 5
PAINLEVEL_OUTOF10: 9
PAINLEVEL_OUTOF10: 7
PAINLEVEL_OUTOF10: 8
PAINLEVEL_OUTOF10: 8

## 2018-06-22 ASSESSMENT — PAIN - FUNCTIONAL ASSESSMENT: PAIN_FUNCTIONAL_ASSESSMENT: 0-10

## 2018-06-22 ASSESSMENT — PAIN DESCRIPTION - DESCRIPTORS: DESCRIPTORS: ACHING

## 2018-06-25 ENCOUNTER — TELEPHONE (OUTPATIENT)
Dept: ORTHOPEDIC SURGERY | Age: 68
End: 2018-06-25

## 2018-06-28 DIAGNOSIS — S43.432D SUPERIOR GLENOID LABRUM LESION OF LEFT SHOULDER, SUBSEQUENT ENCOUNTER: ICD-10-CM

## 2018-06-28 DIAGNOSIS — M75.122 COMPLETE TEAR OF LEFT ROTATOR CUFF: Primary | ICD-10-CM

## 2018-06-28 RX ORDER — SITAGLIPTIN 100 MG/1
TABLET, FILM COATED ORAL
Qty: 30 TABLET | Refills: 5 | Status: SHIPPED | OUTPATIENT
Start: 2018-06-28 | End: 2018-10-09 | Stop reason: SDUPTHER

## 2018-06-29 DIAGNOSIS — G89.18 POSTOPERATIVE PAIN: Primary | ICD-10-CM

## 2018-06-29 RX ORDER — HYDROCODONE BITARTRATE AND ACETAMINOPHEN 5; 325 MG/1; MG/1
1 TABLET ORAL EVERY 6 HOURS PRN
Qty: 21 TABLET | Refills: 0 | Status: SHIPPED | OUTPATIENT
Start: 2018-06-29 | End: 2018-07-06

## 2018-07-02 ENCOUNTER — OFFICE VISIT (OUTPATIENT)
Dept: ORTHOPEDIC SURGERY | Age: 68
End: 2018-07-02

## 2018-07-02 VITALS — HEIGHT: 64 IN | WEIGHT: 160.5 LBS | BODY MASS INDEX: 27.4 KG/M2

## 2018-07-02 DIAGNOSIS — M67.911 DISORDER OF ROTATOR CUFF, RIGHT: Primary | ICD-10-CM

## 2018-07-02 PROCEDURE — 99024 POSTOP FOLLOW-UP VISIT: CPT | Performed by: ORTHOPAEDIC SURGERY

## 2018-07-03 ENCOUNTER — HOSPITAL ENCOUNTER (OUTPATIENT)
Dept: PHYSICAL THERAPY | Age: 68
Discharge: HOME | End: 2018-07-03
Admitting: ORTHOPAEDIC SURGERY

## 2018-07-03 NOTE — FLOWSHEET NOTE
External Rotation     Internal Rotation     Shrugs     Lats     Ext     Flex     Scapular Retraction     BIC     TRIC     PNF          Dynamic Stability          Plyoback          Manual interventions                     Therapeutic Exercise and NMR EXR  [x] (98782) Provided verbal/tactile cueing for activities related to strengthening, flexibility, endurance, ROM  for improvements in scapular, scapulothoracic and UE control with self care, reaching, carrying, lifting, house/yardwork, driving/computer work.    [] (60158) Provided verbal/tactile cueing for activities related to improving balance, coordination, kinesthetic sense, posture, motor skill, proprioception  to assist with  scapular, scapulothoracic and UE control with self care, reaching, carrying, lifting, house/yardwork, driving/computer work. Therapeutic Activities:    [] (99631 or 44738) Provided verbal/tactile cueing for activities related to improving balance, coordination, kinesthetic sense, posture, motor skill, proprioception and motor activation to allow for proper function of scapular, scapulothoracic and UE control with self care, carrying, lifting, driving/computer work.      Home Exercise Program:    [x] (44508) Reviewed/Progressed HEP activities related to strengthening, flexibility, endurance, ROM of scapular, scapulothoracic and UE control with self care, reaching, carrying, lifting, house/yardwork, driving/computer work  [] (97601) Reviewed/Progressed HEP activities related to improving balance, coordination, kinesthetic sense, posture, motor skill, proprioception of scapular, scapulothoracic and UE control with self care, reaching, carrying, lifting, house/yardwork, driving/computer work      Manual Treatments:  PROM / STM / Oscillations-Mobs:  G-I, II, III, IV (PA's, Inf., Post.)  [] (49424) Provided manual therapy to mobilize soft tissue/joints of cervical/CT, scapular GHJ and UE for the purpose of modulating pain, promoting relaxation,  increasing ROM, reducing/eliminating soft tissue swelling/inflammation/restriction, improving soft tissue extensibility and allowing for proper ROM for normal function with self care, reaching, carrying, lifting, house/yardwork, driving/computer work    Modalities: Ice pack 15 min 7/3    Charges:  Timed Code Treatment Minutes: 20   Total Treatment Minutes: 60       [x] EVAL (LOW) 52934 (typically 20 minutes face-to-face)  [] EVAL (MOD) 72779 (typically 30 minutes face-to-face)  [] EVAL (HIGH) 31619 (typically 45 minutes face-to-face)  [] RE-EVAL     [x] FK(88203) x  1   [] IONTO  [] NMR (41733) x      [] VASO  [] Manual (09643) x       [] Other:  [] TA x       [] Mech Traction (36912)  [] ES(attended) (37446)      [] ES (un) (62471):     GOALS: (cut and paste from eval)  Patient stated goal: return to PLOF     Therapist goals for Patient:   Short Term Goals: To be achieved in: 2 weeks  1. Independent in HEP and progression per patient tolerance, in order to prevent re-injury. 2. Patient will have a decrease in pain to facilitate improvement in movement, function, and ADLs as indicated by Functional Deficits.     Long Term Goals: To be achieved in:   1. Disability index score of 20% or less for the UEFS to assist with reaching prior level of function. 4 mo  2. Patient will demonstrate increased AROM to [facility]  to allow for proper joint functioning as indicated by patients Functional Deficits. 8-12 weeks  3. Patient will demonstrate an increase in Strength to good scapular and core control  for UE to allow for proper functional mobility as indicated by patients Functional Deficits. 6 mo  4. Patient will return to functional activities without increased symptoms or restriction. Light adls 2-3 heavy adls 6 mo                    Progression Towards Functional goals:  [] Patient is progressing as expected towards functional goals listed. [] Progression is slowed due to complexities listed.   [] Progression

## 2018-07-03 NOTE — PLAN OF CARE
[x]Dermatomes/Myotomes intact 7/3    []Reflexes equal and normal bilaterally   []Other:    Joint mobility: not assessed due to recent surgery 7/3   []Normal    []Hypo   []Hyper    Palpation: general TTP L shoulder due to recent surgery 7/3    Functional Mobility/Transfers: modified I with ultrasling in place 7/3    Posture: slouched but otherwise wfl for  79 yr old female, Real Eng in place 7/3    Bandages/Dressings/Incisions: portals closed, no signs of infection 7/3    Gait: (include devices/WB status): WNL with ultra sling L UE 7/3                         [x] Patient history, allergies, meds reviewed. Medical chart reviewed. See intake form. 7/3    Review Of Systems (ROS):  [x]Performed Review of systems (Integumentary, CardioPulmonary, Neurological) by intake and observation. Intake form has been scanned into medical record. Patient has been instructed to contact their primary care physician regarding ROS issues if not already being addressed at this time.   7/3     Co-morbidities/Complexities (which will affect course of rehabilitation):   []None           Arthritic conditions   []Rheumatoid arthritis (M05.9)  []Osteoarthritis (M19.91)   Cardiovascular conditions   []Hypertension (I10)  []Hyperlipidemia (E78.5)  []Angina pectoris (I20)  []Atherosclerosis (I70)   Musculoskeletal conditions   []Disc pathology   []Congenital spine pathologies   []Prior surgical intervention  []Osteoporosis (M81.8)  []Osteopenia (M85.8)   Endocrine conditions   []Hypothyroid (E03.9)  []Hyperthyroid Gastrointestinal conditions   []Constipation (X34.19)   Metabolic conditions   []Morbid obesity (E66.01)  [x]Diabetes type 1(E10.65) or 2 (E11.65)   []Neuropathy (G60.9)     Pulmonary conditions   []Asthma (J45)  []Coughing   []COPD (J44.9)   Psychological Disorders  []Anxiety (F41.9)  []Depression (F32.9)   []Other:   []Other:          Barriers to/and or personal factors that will affect rehab potential: characteristics   [] evolving clinical presentation with changing characteristics  [] unstable and unpredictable characteristics;   [x] Clinical decision making of [x] low, [] moderate, [] high complexity using standardized patient assessment instrument and/or measurable assessment of functional outcome. [x] EVAL (LOW) 46221 (typically 20 minutes face-to-face)  [] EVAL (MOD) 31915 (typically 30 minutes face-to-face)  [] EVAL (HIGH) 75102 (typically 45 minutes face-to-face)  [] RE-EVAL       PLAN:  Frequency/Duration:  1-2 days per week for 4 Weeks:7/3-8/3/2018  INTERVENTIONS:  [x] Therapeutic exercise including: strength training, ROM, for Upper extremity and core   [x]  NMR activation and proprioception for UE, scap and Core   [x] Manual therapy as indicated for shoulder, scapula and spine to include: Dry Needling/IASTM, STM, PROM, Gr I-IV mobilizations, manipulation. [x] Modalities as needed that may include: thermal agents, E-stim, Biofeedback, US, iontophoresis as indicated  [x] Patient education on joint protection, postural re-education, activity modification, progression of HEP. HEP instruction: (see scanned forms)    GOALS:  Patient stated goal: return to [facility]    Therapist goals for Patient:   Short Term Goals: To be achieved in: 2 weeks  1. Independent in HEP and progression per patient tolerance, in order to prevent re-injury. 2. Patient will have a decrease in pain to facilitate improvement in movement, function, and ADLs as indicated by Functional Deficits. Long Term Goals: To be achieved in:   1. Disability index score of 20% or less for the UEFS to assist with reaching prior level of function. 4 mo  2. Patient will demonstrate [facility]  to allow for proper joint functioning as indicated by patients Functional Deficits. 8-12 weeks  3.  Patient will demonstrate an increase in Strength to good scapular and core control  for UE to allow for proper functional mobility as indicated

## 2018-07-09 ENCOUNTER — HOSPITAL ENCOUNTER (OUTPATIENT)
Dept: PHYSICAL THERAPY | Age: 68
Discharge: HOME | End: 2018-07-10
Admitting: ORTHOPAEDIC SURGERY

## 2018-07-09 NOTE — FLOWSHEET NOTE
Abduction  scaption ~110 7/9           ER  ~40* at 40* abd           IR             Other(cervical)             Other                    Strength not assessed due to recent RCR 7/3 L R Comment   Flexion         Abduction         ER         IR         Supraspinatus         Upper Trap         Lower Trap         Mid Trap         Rhomboids         Biceps         Triceps         Horizontal Abduction         Horizontal Adduction         Lats            Special Tests Results/Comment   Neurologic Signs Neg 7/3   Functional Reach        Reflexes/Sensation:               [x]Dermatomes/Myotomes intact 7/3               []Reflexes equal and normal bilaterally              []Other:     Joint mobility: not assessed due to recent surgery 7/3              []Normal               []Hypo              []Hyper     Palpation: general TTP L shoulder due to recent surgery 7/3     Functional Mobility/Transfers: modified I with ultrasling in place 7/3     Posture: slouched but otherwise wfl for  79 yr old female, ultrasling in place 7/3     Bandages/Dressings/Incisions: portals closed, no signs of infection 7/3     Gait: (include devices/WB status): WNL with ultra sling L UE 7/3                          [x] Patient history, allergies, meds reviewed. Medical chart reviewed. See intake form. 7/3     Review Of Systems (ROS):  [x]Performed Review of systems (Integumentary, CardioPulmonary, Neurological) by intake and observation. Intake form has been scanned into medical record. Patient has been instructed to contact their primary care physician regarding ROS issues if not already being addressed at this time.   7/3           RESTRICTIONS/PRECAUTIONS: RCR protocol, bicep tenodesis portocol    Exercises/Interventions:   Exercises:  Exercise/Equipment Resistance/Repetitions Other comments   Stretching/PROM     Wand     Table Slides Flexion [id number]    UE [place] ER at 0 [id number] to 40*  only start7/9   Pulleys     Pendulum [id number]    Elbow PROM proprioception of scapular, scapulothoracic and UE control with self care, reaching, carrying, lifting, house/yardwork, driving/computer work      Manual Treatments:  PROM / STM / Oscillations-Mobs:  G-I, II, III, IV ([place], Inf., Post.)  [x] (92807) Provided manual therapy to mobilize soft tissue/joints of cervical/CT, scapular GHJ and UE for the purpose of modulating pain, promoting relaxation,  increasing ROM, reducing/eliminating soft tissue swelling/inflammation/restriction, improving soft tissue extensibility and allowing for proper ROM for normal function with self care, reaching, carrying, lifting, house/yardwork, driving/computer work    Modalities: Ice pack 15 min 7/9    Charges:  Timed Code Treatment Minutes: 35   Total Treatment Minutes: 3996-9454       [] EVAL (LOW) 88102 (typically 20 minutes face-to-face)  [] EVAL (MOD) 96230 (typically 30 minutes face-to-face)  [] EVAL (HIGH) 65082 (typically 45 minutes face-to-face)  [] RE-EVAL     [x] VT(01583) x  1   [] IONTO  [] NMR (17121) x      [] VASO  [x] Manual (17204) x  1    [] Other:  [] TA x       [] Mech Traction (04786)  [] ES(attended) (98452)      [] ES (un) (41007):     GOALS: (cut and paste from eval)  Patient stated goal: return to PLOF     Therapist goals for Patient:   Short Term Goals: To be achieved in: 2 weeks  1. Independent in HEP and progression per patient tolerance, in order to prevent re-injury. 2. Patient will have a decrease in pain to facilitate improvement in movement, function, and ADLs as indicated by Functional Deficits.     Long Term Goals: To be achieved in:   1. Disability index score of 20% or less for the UEFS to assist with reaching prior level of function. 4 mo  2. Patient will demonstrate increased AROM to [facility]  to allow for proper joint functioning as indicated by patients Functional Deficits. 8-12 weeks  3.  Patient will demonstrate an increase in Strength to good scapular and core control  for UE to allow for proper

## 2018-07-13 ENCOUNTER — HOSPITAL ENCOUNTER (OUTPATIENT)
Dept: PHYSICAL THERAPY | Age: 68
Discharge: HOME | End: 2018-07-14
Admitting: ORTHOPAEDIC SURGERY

## 2018-07-13 NOTE — FLOWSHEET NOTE
The [address] [place] [address],[address], [address], [address] [place] [address]  Phone: [phone]   Fax:     [phone]    Physical Therapy Daily Treatment Note  Date:  2018    Patient Name:  Myranda Juárez    :  1950  MRN: [id number]  Restrictions/Precautions:    Medical/Treatment Diagnosis Information:  Diagnosis: M75.122 (ICD-10-CM) - Complete tear of left rotator cuff S43.432D (ICD-10-CM) - Superior glenoid labrum lesion of left shoulder, subsequent encounter  Treatment Diagnosis: decreased L shoulder ROM, decreased L shoulder strength,  L shoulder pain  Procedure performed:   1. Left shoulder diagnostic arthroscopy  2. Left shoulder arthroscopic subacromial decompression  3. Left shoulder arthroscopic distal clavicle excision  4. Left shoulder arthroscopic rotator cuff repair using a dual loaded 6.92 helical coil and 2 dual loaded Multi-fix S lateral row anchors  5. Left shoulder arthroscopic rotator cuff augmentation with Regeneten augmentation graft large graft size and 8 soft tissue staples  6. Left shoulder long head of the biceps tenodesis using 2 dual loaded ultra braid sutures tied into the rotator cuff    Insurance/Certification information:  PT Insurance Information: [place]  Physician Information:  Referring Practitioner: Belén Forrester DO  Plan of care signed (Y/N):     Date of Patient follow up with Physician:     G-Code (if applicable):      Date G-Code Applied:  94% 7/3       Progress Note:  Next due by: Visit #10  Or week of 8/3    Latex Allergy:  [x]NO      []YES  Preferred Language for Healthcare:   [x]English       []other:    Visit # Insurance Allowable   3        eval 7/3 UNL 40copay     Pain level: 1-3 /10  7/13     SUBJECTIVE:   sore. RTW.  Sleeping is difficult    OBJECTIVE:    R handed             ROM PROM AROM  Comment     L R L R     Flexion ~120             Abduction  scaption ~120 Pulleys     Pendulum [id number]    Elbow PROM Hep     Isometrics     Retraction 3x10     x30    shrugs 3x10    Flexion     Abduction     External Rotation     Internal Rotation     Biceps     Triceps          PRE's     Flexion     Abduction     External Rotation     Internal Rotation     Shrugs     EXT     Reverse Flys     Serratus     Horizontal Abd with ER     Biceps     Triceps     Retraction          Cable Column/Theraband     External Rotation     Internal Rotation     Shrugs     Lats     Ext     Flex     Scapular Retraction     BIC     TRIC     PNF          Dynamic Stability          Plyoback          Manual interventions     PROM  8 min felxion,scaption and ER @ 45*abd               Therapeutic Exercise and NMR EXR  [x] (49171) Provided verbal/tactile cueing for activities related to strengthening, flexibility, endurance, ROM  for improvements in scapular, scapulothoracic and UE control with self care, reaching, carrying, lifting, house/yardwork, driving/computer work.    [] (49567) Provided verbal/tactile cueing for activities related to improving balance, coordination, kinesthetic sense, posture, motor skill, proprioception  to assist with  scapular, scapulothoracic and UE control with self care, reaching, carrying, lifting, house/yardwork, driving/computer work. Therapeutic Activities:    [] (48174 or 50946) Provided verbal/tactile cueing for activities related to improving balance, coordination, kinesthetic sense, posture, motor skill, proprioception and motor activation to allow for proper function of scapular, scapulothoracic and UE control with self care, carrying, lifting, driving/computer work.      Home Exercise Program:    [x] (20453) Reviewed/Progressed HEP activities related to strengthening, flexibility, endurance, ROM of scapular, scapulothoracic and UE control with self care, reaching, carrying, lifting, house/yardwork, driving/computer work  [] (73803) Reviewed/Progressed HEP activities related to improving balance, coordination, kinesthetic sense, posture, motor skill, proprioception of scapular, scapulothoracic and UE control with self care, reaching, carrying, lifting, house/yardwork, driving/computer work      Manual Treatments:  PROM / STM / Oscillations-Mobs:  G-I, II, III, IV (PA's, Inf., Post.)  [x] (83642) Provided manual therapy to mobilize soft tissue/joints of cervical/CT, scapular GHJ and UE for the purpose of modulating pain, promoting relaxation,  increasing ROM, reducing/eliminating soft tissue swelling/inflammation/restriction, improving soft tissue extensibility and allowing for proper ROM for normal function with self care, reaching, carrying, lifting, house/yardwork, driving/computer work    Modalities: Ice pack 15 min 7/13    Charges:  Timed Code Treatment Minutes: 35   Total Treatment Minutes: 5457-2474       [] EVAL (LOW) 04634 (typically 20 minutes face-to-face)  [] EVAL (MOD) 56306 (typically 30 minutes face-to-face)  [] EVAL (HIGH) 06558 (typically 45 minutes face-to-face)  [] RE-EVAL     [x] QD(61511) x  1   [] IONTO  [] NMR (43728) x      [] VASO  [x] Manual (19862) x  1    [] Other:  [] TA x       [] Mech Traction (15446)  [] ES(attended) (82115)      [] ES (un) (66104):     GOALS: (cut and paste from eval)  Patient stated goal: return to PLOF     Therapist goals for Patient:   Short Term Goals: To be achieved in: 2 weeks  1. Independent in HEP and progression per patient tolerance, in order to prevent re-injury. 2. Patient will have a decrease in pain to facilitate improvement in movement, function, and ADLs as indicated by Functional Deficits.     Long Term Goals: To be achieved in:   1. Disability index score of 20% or less for the UEFS to assist with reaching prior level of function. 4 mo  2. Patient will demonstrate increased AROM to [facility]  to allow for proper joint functioning as indicated by patients Functional Deficits. 8-12 weeks  3.  Patient will demonstrate

## 2018-07-16 ENCOUNTER — HOSPITAL ENCOUNTER (OUTPATIENT)
Dept: PHYSICAL THERAPY | Age: 68
Setting detail: THERAPIES SERIES
Discharge: HOME | End: 2018-07-16
Payer: MEDICARE

## 2018-07-16 PROCEDURE — 97110 THERAPEUTIC EXERCISES: CPT

## 2018-07-16 PROCEDURE — 97140 MANUAL THERAPY 1/> REGIONS: CPT

## 2018-07-16 NOTE — FLOWSHEET NOTE
[place] [id number] [address]  Phone: [phone]   Fax:     [phone]    Physical Therapy Daily Treatment Note  Date:  2018    Patient Name:  Pieter Perez    :  1950  MRN: [id number]  Restrictions/Precautions:    Medical/Treatment Diagnosis Information:  Diagnosis: M75.122 (ICD-10-CM) - Complete tear of left rotator cuff S43.432D (ICD-10-CM) - Superior glenoid labrum lesion of left shoulder, subsequent encounter  Treatment Diagnosis: decreased L shoulder ROM, decreased L shoulder strength,  L shoulder pain  Procedure performed:   1. Left shoulder diagnostic arthroscopy  2. Left shoulder arthroscopic subacromial decompression  3. Left shoulder arthroscopic distal clavicle excision  4. Left shoulder arthroscopic rotator cuff repair using a dual loaded 7.29 helical coil and 2 dual loaded Multi-fix S lateral row anchors  5. Left shoulder arthroscopic rotator cuff augmentation with Regeneten augmentation graft large graft size and 8 soft tissue staples  6. Left shoulder long head of the biceps tenodesis using 2 dual loaded ultra braid sutures tied into the rotator cuff    Insurance/Certification information:  PT Insurance Information: [place]  Physician Information:  Referring Practitioner: Kayla Landis DO  Plan of care signed (Y/N):     Date of Patient follow up with Physician:     G-Code (if applicable):      Date G-Code Applied:  94% 7/3       Progress Note:  Next due by: Visit #10  Or week of 8/3    Latex Allergy:  [x]NO      []YES  Preferred Language for Healthcare:   [x]English       []other:    Visit # Insurance Allowable   4        eval 7/3 UNL 40copay     Pain level: 1/10  7/16    SUBJECTIVE:   low pain level, sleeping is difficult.      OBJECTIVE:    R handed             ROM PROM AROM  Comment     L R L R     Flexion ~140             Abduction  scaption ~120 7/13           ER  ~40* at 40* abd 7/13           IR             Other(cervical)             Other                    Strength not assessed due to recent RCR 7/3 L R Comment   Flexion         Abduction         ER         IR         Supraspinatus         Upper Trap         Lower Trap         Mid Trap         Rhomboids         Biceps         Triceps         Horizontal Abduction         Horizontal Adduction         Lats            Special Tests Results/Comment   Neurologic Signs Neg 7/3   Functional Reach        Reflexes/Sensation:               [x]Dermatomes/Myotomes intact 7/3               []Reflexes equal and normal bilaterally              []Other:     Joint mobility: not assessed due to recent surgery 7/3              []Normal               []Hypo              []Hyper     Palpation: general TTP L shoulder due to recent surgery 7/3     Functional Mobility/Transfers: modified I with ultrasling in place 7/3     Posture: slouched but otherwise wfl for  79 yr old female, ultrasling in place 7/3     Bandages/Dressings/Incisions: portals closed, no signs of infection 7/3     Gait: (include devices/WB status): WNL with ultra sling L UE 7/3                          [x] Patient history, allergies, meds reviewed. Medical chart reviewed. See intake form. 7/3     Review Of Systems (ROS):  [x]Performed Review of systems (Integumentary, CardioPulmonary, Neurological) by intake and observation. Intake form has been scanned into medical record. Patient has been instructed to contact their primary care physician regarding ROS issues if not already being addressed at this time.   7/3           RESTRICTIONS/PRECAUTIONS: RCR protocol, bicep tenodesis portocol    Exercises/Interventions:   Exercises:  Exercise/Equipment Resistance/Repetitions Other comments   Stretching/PROM     Supine flexion pt grab wrist  x15 start7/13   Table Slides Flexion [id number]    UE [place] ER at 0 [id number] to 40*  only Start7/9 7/13 reviewed technique activities related to improving balance, coordination, kinesthetic sense, posture, motor skill, proprioception of scapular, scapulothoracic and UE control with self care, reaching, carrying, lifting, house/yardwork, driving/computer work      Manual Treatments:  PROM / STM / Oscillations-Mobs:  G-I, II, III, IV (PA's, Inf., Post.)  [x] (87635) Provided manual therapy to mobilize soft tissue/joints of cervical/CT, scapular GHJ and UE for the purpose of modulating pain, promoting relaxation,  increasing ROM, reducing/eliminating soft tissue swelling/inflammation/restriction, improving soft tissue extensibility and allowing for proper ROM for normal function with self care, reaching, carrying, lifting, house/yardwork, driving/computer work    Modalities: Ice pack 10 min 7/16    Charges:  Timed Code Treatment Minutes: 35   Total Treatment Minutes: 50       [] EVAL (LOW) 13297 (typically 20 minutes face-to-face)  [] EVAL (MOD) 14287 (typically 30 minutes face-to-face)  [] EVAL (HIGH) 71065 (typically 45 minutes face-to-face)  [] RE-EVAL     [x] RZ(18833) x  1   [] IONTO  [] NMR (25893) x      [] VASO  [x] Manual (82469) x  1    [] Other:  [] TA x       [] Mech Traction (45516)  [] ES(attended) (63344)      [] ES (un) (93592):     GOALS: (cut and paste from eval)  Patient stated goal: return to PLOF     Therapist goals for Patient:   Short Term Goals: To be achieved in: 2 weeks  1. Independent in HEP and progression per patient tolerance, in order to prevent re-injury. 2. Patient will have a decrease in pain to facilitate improvement in movement, function, and ADLs as indicated by Functional Deficits.     Long Term Goals: To be achieved in:   1. Disability index score of 20% or less for the UEFS to assist with reaching prior level of function. 4 mo  2. Patient will demonstrate increased AROM to [facility]  to allow for proper joint functioning as indicated by patients Functional Deficits. 8-12 weeks  3.  Patient will

## 2018-07-18 ENCOUNTER — HOSPITAL ENCOUNTER (OUTPATIENT)
Dept: PHYSICAL THERAPY | Age: 68
Discharge: HOME | End: 2018-07-18

## 2018-07-20 ENCOUNTER — HOSPITAL ENCOUNTER (OUTPATIENT)
Dept: PHYSICAL THERAPY | Age: 68
Setting detail: THERAPIES SERIES
Discharge: HOME | End: 2018-07-20
Payer: MEDICARE

## 2018-07-20 PROCEDURE — 97140 MANUAL THERAPY 1/> REGIONS: CPT

## 2018-07-20 PROCEDURE — 97110 THERAPEUTIC EXERCISES: CPT

## 2018-07-20 NOTE — FLOWSHEET NOTE
7/20           ER  ~40* at 40* abd 7/20           IR             Other(cervical)             Other                    Strength not assessed due to recent RCR 7/3 L R Comment   Flexion         Abduction         ER         IR         Supraspinatus         Upper Trap         Lower Trap         Mid Trap         Rhomboids         Biceps         Triceps         Horizontal Abduction         Horizontal Adduction         Lats            Special Tests Results/Comment   Neurologic Signs Neg 7/3   Functional Reach        Reflexes/Sensation:               [x]Dermatomes/Myotomes intact 7/3               []Reflexes equal and normal bilaterally              []Other:     Joint mobility: not assessed due to recent surgery 7/3              []Normal               []Hypo              []Hyper     Palpation: general TTP L shoulder due to recent surgery 7/3     Functional Mobility/Transfers: modified I with ultrasling in place 7/3     Posture: slouched but otherwise wfl for  79 yr old female, ultrasling in place 7/3     Bandages/Dressings/Incisions: portals closed, no signs of infection 7/3     Gait: (include devices/WB status): WNL with ultra sling L UE 7/3                          [x] Patient history, allergies, meds reviewed. Medical chart reviewed. See intake form. 7/3     Review Of Systems (ROS):  [x]Performed Review of systems (Integumentary, CardioPulmonary, Neurological) by intake and observation. Intake form has been scanned into medical record. Patient has been instructed to contact their primary care physician regarding ROS issues if not already being addressed at this time.   7/3         RESTRICTIONS/PRECAUTIONS: RCR protocol, bicep tenodesis portocol    Exercises/Interventions:   Exercises:  Exercise/Equipment Resistance/Repetitions Other comments   Stretching/PROM     Supine flexion pt grab wrist  x15 start7/13   Table Slides Flexion [id number]    UE [place] ER at 0 [id number] to 40*  only Start7/9 7/13 reviewed technique Pulleys     Pendulum [id number]    Elbow PROM Hep     Isometrics     Retraction 3x10     x30    shrugs 3x10    Flexion     Abduction     External Rotation     Internal Rotation     Biceps     Triceps          PRE's     Flexion     Abduction     External Rotation     Internal Rotation     Shrugs     EXT     Reverse Flys     Serratus     Horizontal Abd with ER     Biceps     Triceps     Retraction          Cable Column/Theraband     External Rotation     Internal Rotation     Shrugs     Lats     Ext     Flex     Scapular Retraction     BIC     TRIC     PNF          Dynamic Stability          Plyoback          Manual interventions     PROM  15 min flexion,scaption and ER @ 45*abd 7/20              Therapeutic Exercise and NMR EXR  [x] (69916) Provided verbal/tactile cueing for activities related to strengthening, flexibility, endurance, ROM  for improvements in scapular, scapulothoracic and UE control with self care, reaching, carrying, lifting, house/yardwork, driving/computer work.    [] (11188) Provided verbal/tactile cueing for activities related to improving balance, coordination, kinesthetic sense, posture, motor skill, proprioception  to assist with  scapular, scapulothoracic and UE control with self care, reaching, carrying, lifting, house/yardwork, driving/computer work. Therapeutic Activities:    [] (39942 or 55876) Provided verbal/tactile cueing for activities related to improving balance, coordination, kinesthetic sense, posture, motor skill, proprioception and motor activation to allow for proper function of scapular, scapulothoracic and UE control with self care, carrying, lifting, driving/computer work.      Home Exercise Program:    [x] (27990) Reviewed/Progressed HEP activities related to strengthening, flexibility, endurance, ROM of scapular, scapulothoracic and UE control with self care, reaching, carrying, lifting, house/yardwork, driving/computer work  [] (81782) Reviewed/Progressed HEP activities related to improving balance, coordination, kinesthetic sense, posture, motor skill, proprioception of scapular, scapulothoracic and UE control with self care, reaching, carrying, lifting, house/yardwork, driving/computer work      Manual Treatments:  PROM / STM / Oscillations-Mobs:  G-I, II, III, IV (PA's, Inf., Post.)  [x] (39947) Provided manual therapy to mobilize soft tissue/joints of cervical/CT, scapular GHJ and UE for the purpose of modulating pain, promoting relaxation,  increasing ROM, reducing/eliminating soft tissue swelling/inflammation/restriction, improving soft tissue extensibility and allowing for proper ROM for normal function with self care, reaching, carrying, lifting, house/yardwork, driving/computer work    Modalities: Ice pack 10 min 7/16    Charges:  Timed Code Treatment Minutes: 30   Total Treatment Minutes: 40       [] EVAL (LOW) 34904 (typically 20 minutes face-to-face)  [] EVAL (MOD) 86146 (typically 30 minutes face-to-face)  [] EVAL (HIGH) 20333 (typically 45 minutes face-to-face)  [] RE-EVAL     [x] NE(87200) x  1   [] IONTO  [] NMR (14617) x      [] VASO  [x] Manual (54073) x  1    [] Other:  [] TA x       [] Mech Traction (37676)  [] ES(attended) (65521)      [] ES (un) (68195):     GOALS: (cut and paste from eval)  Patient stated goal: return to PLOF     Therapist goals for Patient:   Short Term Goals: To be achieved in: 2 weeks  1. Independent in HEP and progression per patient tolerance, in order to prevent re-injury. 2. Patient will have a decrease in pain to facilitate improvement in movement, function, and ADLs as indicated by Functional Deficits.     Long Term Goals: To be achieved in:   1. Disability index score of 20% or less for the UEFS to assist with reaching prior level of function. 4 mo  2. Patient will demonstrate increased AROM to [facility]  to allow for proper joint functioning as indicated by patients Functional Deficits. 8-12 weeks  3.  Patient will

## 2018-07-23 ENCOUNTER — HOSPITAL ENCOUNTER (OUTPATIENT)
Dept: PHYSICAL THERAPY | Age: 68
Setting detail: THERAPIES SERIES
Discharge: HOME | End: 2018-07-23
Payer: MEDICARE

## 2018-07-23 DIAGNOSIS — G47.00 INSOMNIA, UNSPECIFIED TYPE: ICD-10-CM

## 2018-07-23 PROCEDURE — 97110 THERAPEUTIC EXERCISES: CPT | Performed by: PHYSICAL THERAPIST

## 2018-07-23 PROCEDURE — 97140 MANUAL THERAPY 1/> REGIONS: CPT | Performed by: PHYSICAL THERAPIST

## 2018-07-23 RX ORDER — ESZOPICLONE 1 MG/1
1 TABLET, FILM COATED ORAL NIGHTLY PRN
Qty: 30 TABLET | Refills: 0 | OUTPATIENT
Start: 2018-07-23 | End: 2018-08-22

## 2018-07-23 NOTE — FLOWSHEET NOTE
ER  ~55* at 72* abd 7/23           IR  ~55*@ 65* abd 7/23           Other(cervical)             Other                    Strength not assessed due to recent RCR 7/3 L R Comment   Flexion         Abduction         ER         IR         Supraspinatus         Upper Trap         Lower Trap         Mid Trap         Rhomboids         Biceps         Triceps         Horizontal Abduction         Horizontal Adduction         Lats            Special Tests Results/Comment   Neurologic Signs Neg 7/3   Functional Reach        Reflexes/Sensation:               [x]Dermatomes/Myotomes intact 7/3               []Reflexes equal and normal bilaterally              []Other:     Joint mobility: not assessed due to recent surgery 7/3              []Normal               []Hypo              []Hyper     Palpation: general TTP L shoulder due to recent surgery 7/3     Functional Mobility/Transfers: modified I with ultrasling in place 7/3     Posture: slouched but otherwise wfl for  79 yr old female, ultrasling in place 7/3     Bandages/Dressings/Incisions: portals closed, no signs of infection 7/3     Gait: (include devices/WB status): WNL with ultra sling L UE 7/3                          [x] Patient history, allergies, meds reviewed. Medical chart reviewed. See intake form. 7/3     Review Of Systems (ROS):  [x]Performed Review of systems (Integumentary, CardioPulmonary, Neurological) by intake and observation. Intake form has been scanned into medical record. Patient has been instructed to contact their primary care physician regarding ROS issues if not already being addressed at this time.   7/3         RESTRICTIONS/PRECAUTIONS: RCR protocol, bicep tenodesis portocol    Exercises/Interventions:   Exercises:  Exercise/Equipment Resistance/Repetitions Other comments   Stretching/PROM     Supine flexion pt grab wrist  x15 start7/13   Table Slides Flexion [id number]    UE [place] ER at 0 [id number] to 40*  only Start7/9 7/13 reviewed technique Pulleys     Pendulum [id number]    Elbow PROM Hep     Isometrics     Retraction 3x10     x30    shrugs 3x10    Flexion     Abduction     External Rotation     Internal Rotation     Biceps     Triceps          PRE's     Flexion     Abduction     External Rotation     Internal Rotation     Shrugs     EXT     Reverse Flys     Serratus     Horizontal Abd with ER     Biceps     Triceps     Retraction          Cable Column/Theraband     External Rotation     Internal Rotation     Shrugs     Lats     Ext     Flex     Scapular Retraction     BIC     TRIC     PNF          Dynamic Stability          Plyoback          Manual interventions     PROM  15 min flexion,scaption and ER @ 65*abd 7/23              Therapeutic Exercise and NMR EXR  [x] (03153) Provided verbal/tactile cueing for activities related to strengthening, flexibility, endurance, ROM  for improvements in scapular, scapulothoracic and UE control with self care, reaching, carrying, lifting, house/yardwork, driving/computer work.    [] (97506) Provided verbal/tactile cueing for activities related to improving balance, coordination, kinesthetic sense, posture, motor skill, proprioception  to assist with  scapular, scapulothoracic and UE control with self care, reaching, carrying, lifting, house/yardwork, driving/computer work. Therapeutic Activities:    [] (02131 or 25115) Provided verbal/tactile cueing for activities related to improving balance, coordination, kinesthetic sense, posture, motor skill, proprioception and motor activation to allow for proper function of scapular, scapulothoracic and UE control with self care, carrying, lifting, driving/computer work.      Home Exercise Program:    [x] (48954) Reviewed/Progressed HEP activities related to strengthening, flexibility, endurance, ROM of scapular, scapulothoracic and UE control with self care, reaching, carrying, lifting, house/yardwork, driving/computer work  [] (36012) Reviewed/Progressed HEP activities related to improving balance, coordination, kinesthetic sense, posture, motor skill, proprioception of scapular, scapulothoracic and UE control with self care, reaching, carrying, lifting, house/yardwork, driving/computer work      Manual Treatments:  PROM / STM / Oscillations-Mobs:  G-I, II, III, IV (PA's, Inf., Post.)  [x] (14813) Provided manual therapy to mobilize soft tissue/joints of cervical/CT, scapular GHJ and UE for the purpose of modulating pain, promoting relaxation,  increasing ROM, reducing/eliminating soft tissue swelling/inflammation/restriction, improving soft tissue extensibility and allowing for proper ROM for normal function with self care, reaching, carrying, lifting, house/yardwork, driving/computer work    Modalities:      Charges:  Timed Code Treatment Minutes: 30   Total Treatment Minutes: 45       [] EVAL (LOW) 11682 (typically 20 minutes face-to-face)  [] EVAL (MOD) 52250 (typically 30 minutes face-to-face)  [] EVAL (HIGH) 46062 (typically 45 minutes face-to-face)  [] RE-EVAL     [x] ZR(74657) x  1   [] IONTO  [] NMR (79997) x      [] VASO  [x] Manual (02108) x  1    [] Other:  [] TA x       [] Mech Traction (45114)  [] ES(attended) (72201)      [] ES (un) (91476):     GOALS: (cut and paste from eval)  Patient stated goal: return to PLOF     Therapist goals for Patient:   Short Term Goals: To be achieved in: 2 weeks  1. Independent in HEP and progression per patient tolerance, in order to prevent re-injury. 2. Patient will have a decrease in pain to facilitate improvement in movement, function, and ADLs as indicated by Functional Deficits.     Long Term Goals: To be achieved in:   1. Disability index score of 20% or less for the UEFS to assist with reaching prior level of function. 4 mo  2. Patient will demonstrate increased AROM to [facility]  to allow for proper joint functioning as indicated by patients Functional Deficits. 8-12 weeks  3.  Patient will demonstrate an increase in

## 2018-07-27 ENCOUNTER — APPOINTMENT (OUTPATIENT)
Dept: PHYSICAL THERAPY | Age: 68
End: 2018-07-27
Payer: MEDICARE

## 2018-07-27 DIAGNOSIS — G47.00 INSOMNIA, UNSPECIFIED TYPE: ICD-10-CM

## 2018-07-31 ENCOUNTER — HOSPITAL ENCOUNTER (OUTPATIENT)
Dept: PHYSICAL THERAPY | Age: 68
Setting detail: THERAPIES SERIES
Discharge: HOME | End: 2018-07-31
Payer: MEDICARE

## 2018-07-31 PROCEDURE — 97140 MANUAL THERAPY 1/> REGIONS: CPT | Performed by: PHYSICAL THERAPIST

## 2018-07-31 PROCEDURE — 97110 THERAPEUTIC EXERCISES: CPT | Performed by: PHYSICAL THERAPIST

## 2018-07-31 NOTE — FLOWSHEET NOTE
ER  ~55* at 72* abd 7/23           IR  ~55*@ 65* abd 7/23           Other(cervical)             Other                    Strength not assessed due to recent RCR 7/3 L R Comment   Flexion         Abduction         ER         IR         Supraspinatus         Upper Trap         Lower Trap         Mid Trap         Rhomboids         Biceps         Triceps         Horizontal Abduction         Horizontal Adduction         Lats            Special Tests Results/Comment   Neurologic Signs Neg 7/3   Functional Reach        Reflexes/Sensation:               [x]Dermatomes/Myotomes intact 7/3               []Reflexes equal and normal bilaterally              []Other:     Joint mobility: not assessed due to recent surgery 7/3              []Normal               []Hypo              []Hyper     Palpation: general TTP L shoulder due to recent surgery 7/3     Functional Mobility/Transfers: modified I with ultrasling in place 7/3     Posture: slouched but otherwise wfl for  79 yr old female, ultrasling in place 7/3     Bandages/Dressings/Incisions: portals closed, no signs of infection 7/3     Gait: (include devices/WB status): WNL with ultra sling L UE 7/3                          [x] Patient history, allergies, meds reviewed. Medical chart reviewed. See intake form. 7/3     Review Of Systems (ROS):  [x]Performed Review of systems (Integumentary, CardioPulmonary, Neurological) by intake and observation. Intake form has been scanned into medical record. Patient has been instructed to contact their primary care physician regarding ROS issues if not already being addressed at this time.   7/3         RESTRICTIONS/PRECAUTIONS: RCR protocol, bicep tenodesis protocol    Exercises/Interventions:   Exercises:  Exercise/Equipment Resistance/Repetitions Other comments   Stretching/PROM     Supine flexion pt grab wrist   Cane supine x15    x10 Start7/13    start7/31   Table Slides Flexion [id number]    UE [place] ER at 0 [id number] to 40* scapulothoracic and UE control with self care, reaching, carrying, lifting, house/yardwork, driving/computer work  [] (65782) Reviewed/Progressed HEP activities related to improving balance, coordination, kinesthetic sense, posture, motor skill, proprioception of scapular, scapulothoracic and UE control with self care, reaching, carrying, lifting, house/yardwork, driving/computer work      Manual Treatments:  PROM / STM / Oscillations-Mobs:  G-I, II, III, IV (PA's, Inf., Post.)  [x] (03917) Provided manual therapy to mobilize soft tissue/joints of cervical/CT, scapular GHJ and UE for the purpose of modulating pain, promoting relaxation,  increasing ROM, reducing/eliminating soft tissue swelling/inflammation/restriction, improving soft tissue extensibility and allowing for proper ROM for normal function with self care, reaching, carrying, lifting, house/yardwork, driving/computer work    Modalities:      Charges:  Timed Code Treatment Minutes: 30   Total Treatment Minutes: 45       [] EVAL (LOW) 68890 (typically 20 minutes face-to-face)  [] EVAL (MOD) 44315 (typically 30 minutes face-to-face)  [] EVAL (HIGH) 91677 (typically 45 minutes face-to-face)  [] RE-EVAL     [x] TB(37844) x  1   [] IONTO  [] NMR (67543) x      [] VASO  [x] Manual (62964) x  1    [] Other:  [] TA x       [] Mech Traction (31663)  [] ES(attended) (47852)      [] ES (un) (08959):     GOALS: (cut and paste from eval)  Patient stated goal: return to PLOF     Therapist goals for Patient:   Short Term Goals: To be achieved in: 2 weeks  1. Independent in HEP and progression per patient tolerance, in order to prevent re-injury. 2. Patient will have a decrease in pain to facilitate improvement in movement, function, and ADLs as indicated by Functional Deficits.     Long Term Goals: To be achieved in:   1. Disability index score of 20% or less for the UEFS to assist with reaching prior level of function. 4 mo  2.  Patient will demonstrate increased AROM to [facility]  to allow for proper joint functioning as indicated by patients Functional Deficits. 8-12 weeks  3. Patient will demonstrate an increase in Strength to good scapular and core control  for UE to allow for proper functional mobility as indicated by patients Functional Deficits. 6 mo  4. Patient will return to functional activities without increased symptoms or restriction. Light adls 2-3 heavy adls 6 mo                    Progression Towards Functional goals:  [] Patient is progressing as expected towards functional goals listed. [] Progression is slowed due to complexities listed. [] Progression has been slowed due to co-morbidities. [x] Plan just implemented, too soon to assess goals progression  [] Other:     ASSESSMENT:  See eval    Treatment/Activity Tolerance:  [x] Patient tolerated treatment well  7/31 [] Patient limited by fatique  [] Patient limited by pain  [] Patient limited by other medical complications  [x] Other:    Patient education:7/3 reviewed diagnosis, POC, HEP, RCR restrictions.   Pt reports understanding    Prognosis: [] Good [] Fair  [] Poor    Patient Requires Follow-up: [x] Yes  [] No    PLAN: 1-2 days per week for 4 Weeks:7/3-8/3/2018  [x] Continue per plan of care [] Alter current plan (see comments)  [] Plan of care initiated [] Hold pending MD visit [] Discharge    Electronically signed by: Jim Dubon PT,

## 2018-08-02 ENCOUNTER — OFFICE VISIT (OUTPATIENT)
Dept: ORTHOPEDIC SURGERY | Age: 68
End: 2018-08-02

## 2018-08-02 ENCOUNTER — HOSPITAL ENCOUNTER (OUTPATIENT)
Dept: PHYSICAL THERAPY | Age: 68
Setting detail: THERAPIES SERIES
Discharge: HOME | End: 2018-08-02
Payer: MEDICARE

## 2018-08-02 VITALS — BODY MASS INDEX: 27.31 KG/M2 | HEIGHT: 64 IN | WEIGHT: 160 LBS

## 2018-08-02 DIAGNOSIS — M75.122 COMPLETE TEAR OF LEFT ROTATOR CUFF: Primary | ICD-10-CM

## 2018-08-02 PROCEDURE — 99024 POSTOP FOLLOW-UP VISIT: CPT | Performed by: ORTHOPAEDIC SURGERY

## 2018-08-02 PROCEDURE — 97110 THERAPEUTIC EXERCISES: CPT | Performed by: PHYSICAL THERAPIST

## 2018-08-02 PROCEDURE — 97140 MANUAL THERAPY 1/> REGIONS: CPT | Performed by: PHYSICAL THERAPIST

## 2018-08-02 NOTE — FLOWSHEET NOTE
The [facility] and [facility], [address] [place] [address] Drive [address] [facility], [address] [place] [address]  Phone: [phone]   Fax:     [phone]    Physical Therapy Daily Treatment Note  Date:  2018    Patient Name:  Steven Olivares    :  1950  MRN: [id number]  Restrictions/Precautions:    Medical/Treatment Diagnosis Information:  Diagnosis: M75.122 (ICD-10-CM) - Complete tear of left rotator cuff S43.432D (ICD-10-CM) - Superior glenoid labrum lesion of left shoulder, subsequent encounter  Treatment Diagnosis: decreased L shoulder ROM, decreased L shoulder strength,  L shoulder pain  Procedure performed:   1. Left shoulder diagnostic arthroscopy  2. Left shoulder arthroscopic subacromial decompression  3. Left shoulder arthroscopic distal clavicle excision  4. Left shoulder arthroscopic rotator cuff repair using a dual loaded 2.83 helical coil and 2 dual loaded Multi-fix S lateral row anchors  5. Left shoulder arthroscopic rotator cuff augmentation with Regeneten augmentation graft large graft size and 8 soft tissue staples  6.   Left shoulder long head of the biceps tenodesis using 2 dual loaded ultra braid sutures tied into the rotator cuff    Insurance/Certification information:  PT Insurance Information: [place]  Physician Information:  Referring Practitioner: Parrish Isaac DO  Plan of care signed (Y/N):     Date of Patient follow up with Physician:     G-Code (if applicable):      Date G-Code Applied:  94% 7/3       Progress Note:  Next due by: Visit #10  Or week of 8/3    Latex Allergy:  [x]NO      []YES  Preferred Language for Healthcare:   [x]English       []other:    Visit # Insurance Allowable   8    POC      eval 7/3 UNL 40copay     Pain level: 0/10      SUBJECTIVE:   see MD today    OBJECTIVE:    R handed             ROM PROM AROM  Comment     L R L R     Flexion ~140*             Abduction  scaption ~145*            ER  ~55* at 72* abd 7/23           IR  ~55*@ 65* abd 7/23           Other(cervical)             Other                    Strength not assessed due to recent RCR 7/3 L R Comment   Flexion         Abduction         ER         IR         Supraspinatus         Upper Trap         Lower Trap         Mid Trap         Rhomboids         Biceps         Triceps         Horizontal Abduction         Horizontal Adduction         Lats            Special Tests Results/Comment   Neurologic Signs Neg 7/3   Functional Reach        Reflexes/Sensation:               [x]Dermatomes/Myotomes intact 7/3               []Reflexes equal and normal bilaterally              []Other:     Joint mobility: not assessed due to recent surgery 7/3              []Normal               []Hypo              []Hyper     Palpation: general TTP L shoulder due to recent surgery 7/3     Functional Mobility/Transfers: modified I with ultrasling in place 7/3     Posture: slouched but otherwise wfl for  79 yr old female, ultrasling in place 7/3     Bandages/Dressings/Incisions: portals closed, no signs of infection 7/3     Gait: (include devices/WB status): WNL with ultra sling L UE 7/3                          [x] Patient history, allergies, meds reviewed. Medical chart reviewed. See intake form. 7/3     Review Of Systems (ROS):  [x]Performed Review of systems (Integumentary, CardioPulmonary, Neurological) by intake and observation. Intake form has been scanned into medical record. Patient has been instructed to contact their primary care physician regarding ROS issues if not already being addressed at this time.   7/3         RESTRICTIONS/PRECAUTIONS: RCR protocol, bicep tenodesis protocol    Exercises/Interventions:   Exercises:  Exercise/Equipment Resistance/Repetitions Other comments   Stretching/PROM     Supine flexion pt grab wrist   Cane supine x15    x10 Start7/13    start7/31   Table Slides Flexion [id number]    UE [place] ER at 0 [id number] to 40*  only Start7/9 7/13 reviewed technique   Pulleys Flexion [id number] start7/31   Pendulum [id number]    Elbow PROM Hep     Isometrics     Retraction 3x10     x30    shrugs 3x10    Flexion     Abduction     External Rotation     Internal Rotation     Biceps     Triceps          PRE's     Flexion     Abduction     External Rotation celeste 5secx8 at wall 25% effort. Unable to perform step outs with red tband start7/31   Internal Rotation     Shrugs     EXT     Reverse Flys     Serratus     Horizontal Abd with ER     Biceps     Triceps     Retraction          Cable Column/Theraband     External Rotation     Internal Rotation     Shrugs     Lats     Ext     Flex     Scapular Retraction     BIC     TRIC     PNF          Dynamic Stability          Plyoback          Manual interventions     PROM  15 min flexion,scaption and ER @ 65*abd 7/23              Therapeutic Exercise and NMR EXR  [x] (04369) Provided verbal/tactile cueing for activities related to strengthening, flexibility, endurance, ROM  for improvements in scapular, scapulothoracic and UE control with self care, reaching, carrying, lifting, house/yardwork, driving/computer work.    [] (66573) Provided verbal/tactile cueing for activities related to improving balance, coordination, kinesthetic sense, posture, motor skill, proprioception  to assist with  scapular, scapulothoracic and UE control with self care, reaching, carrying, lifting, house/yardwork, driving/computer work. Therapeutic Activities:    [] (34299 or 92162) Provided verbal/tactile cueing for activities related to improving balance, coordination, kinesthetic sense, posture, motor skill, proprioception and motor activation to allow for proper function of scapular, scapulothoracic and UE control with self care, carrying, lifting, driving/computer work.      Home Exercise Program:    [x] (60493) Reviewed/Progressed HEP activities related to strengthening, flexibility, endurance, ROM of scapular, scapulothoracic and UE control with self care, reaching, carrying, lifting, house/yardwork, driving/computer work  [] (04635) Reviewed/Progressed HEP activities related to improving balance, coordination, kinesthetic sense, posture, motor skill, proprioception of scapular, scapulothoracic and UE control with self care, reaching, carrying, lifting, house/yardwork, driving/computer work      Manual Treatments:  PROM / STM / Oscillations-Mobs:  G-I, II, III, IV (PA's, Inf., Post.)  [x] (60339) Provided manual therapy to mobilize soft tissue/joints of cervical/CT, scapular GHJ and UE for the purpose of modulating pain, promoting relaxation,  increasing ROM, reducing/eliminating soft tissue swelling/inflammation/restriction, improving soft tissue extensibility and allowing for proper ROM for normal function with self care, reaching, carrying, lifting, house/yardwork, driving/computer work    Modalities:      Charges:  Timed Code Treatment Minutes: 30   Total Treatment Minutes: 45       [] EVAL (LOW) 55362 (typically 20 minutes face-to-face)  [] EVAL (MOD) 76010 (typically 30 minutes face-to-face)  [] EVAL (HIGH) 71580 (typically 45 minutes face-to-face)  [] RE-EVAL     [x] MS(61333) x  1   [] IONTO  [] NMR (04524) x      [] VASO  [x] Manual (47946) x  1    [] Other:  [] TA x       [] Mech Traction (90911)  [] ES(attended) (71782)      [] ES (un) (73071):     GOALS: (cut and paste from eval)  Patient stated goal: return to PLOF     Therapist goals for Patient:   Short Term Goals: To be achieved in: 2 weeks  1. Independent in HEP and progression per patient tolerance, in order to prevent re-injury. 2. Patient will have a decrease in pain to facilitate improvement in movement, function, and ADLs as indicated by Functional Deficits.     Long Term Goals: To be achieved in:   1. Disability index score of 20% or less for the UEFS to assist with reaching prior level of function. 4 mo  2.  Patient will demonstrate increased AROM to [facility]  to allow for proper joint functioning as indicated by patients Functional Deficits. 8-12 weeks  3. Patient will demonstrate an increase in Strength to good scapular and core control  for UE to allow for proper functional mobility as indicated by patients Functional Deficits. 6 mo  4. Patient will return to functional activities without increased symptoms or restriction. Light adls 2-3 heavy adls 6 mo                    Progression Towards Functional goals:  [] Patient is progressing as expected towards functional goals listed. [] Progression is slowed due to complexities listed. [] Progression has been slowed due to co-morbidities. [x] Plan just implemented, too soon to assess goals progression  [] Other:     ASSESSMENT:  See eval    Treatment/Activity Tolerance:  [x] Patient tolerated treatment well  8/2 [] Patient limited by fatique  [] Patient limited by pain  [] Patient limited by other medical complications  [x] Other:    Patient education:7/3 reviewed diagnosis, POC, HEP, RCR restrictions.   Pt reports understanding    Prognosis: [] Good [] Fair  [] Poor    Patient Requires Follow-up: [x] Yes  [] No    PLAN: 1-2 days per week for 4 Weeks:7/3-8/3/2018  [x] Continue per plan of care [] Alter current plan (see comments)  [] Plan of care initiated [] Hold pending MD visit [] Discharge    Electronically signed by: Óscar Nassar PT,

## 2018-08-06 ENCOUNTER — TELEPHONE (OUTPATIENT)
Dept: INTERNAL MEDICINE CLINIC | Age: 68
End: 2018-08-06

## 2018-08-06 ENCOUNTER — PATIENT MESSAGE (OUTPATIENT)
Dept: INTERNAL MEDICINE CLINIC | Age: 68
End: 2018-08-06

## 2018-08-06 DIAGNOSIS — G47.00 INSOMNIA, UNSPECIFIED TYPE: ICD-10-CM

## 2018-08-06 RX ORDER — ESZOPICLONE 1 MG/1
1 TABLET, FILM COATED ORAL NIGHTLY PRN
Qty: 30 TABLET | Refills: 0 | Status: SHIPPED | OUTPATIENT
Start: 2018-08-06 | End: 2018-08-13 | Stop reason: SDUPTHER

## 2018-08-07 ENCOUNTER — HOSPITAL ENCOUNTER (OUTPATIENT)
Dept: PHYSICAL THERAPY | Age: 68
Setting detail: THERAPIES SERIES
Discharge: HOME | End: 2018-08-07
Payer: MEDICARE

## 2018-08-07 PROCEDURE — 97530 THERAPEUTIC ACTIVITIES: CPT | Performed by: PHYSICAL THERAPIST

## 2018-08-07 PROCEDURE — 97140 MANUAL THERAPY 1/> REGIONS: CPT | Performed by: PHYSICAL THERAPIST

## 2018-08-07 PROCEDURE — G8984 CARRY CURRENT STATUS: HCPCS | Performed by: PHYSICAL THERAPIST

## 2018-08-07 PROCEDURE — 97110 THERAPEUTIC EXERCISES: CPT | Performed by: PHYSICAL THERAPIST

## 2018-08-07 PROCEDURE — G8985 CARRY GOAL STATUS: HCPCS | Performed by: PHYSICAL THERAPIST

## 2018-08-07 NOTE — PLAN OF CARE
The [facility], [address] [employer] [address] [place] [address], [address] [place] [address]  Phone: [phone]   Fax:     [phone]   Physical Therapy Re-Certification Plan of Care    Dear  ,    We had the pleasure of treating the following patient for physical therapy services at [address] [place] [address]. A summary of our findings can be found in the updated assessment below. This includes our plan of care. If you have any questions or concerns regarding these findings, please do not hesitate to contact me at the office phone number checked above. Thank you for the referral.     Physician Signature:________________________________Date:__________________  By signing above (or electronic signature), therapists plan is approved by physician      Overall Response to Treatment:   [x]Patient is responding well to treatment and improvement is noted with regards  to goals   []Patient should continue to improve in reasonable time if they continue HEP   []Patient has plateaued and is no longer responding to skilled PT intervention    []Patient is getting worse and would benefit from return to referring MD   []Patient unable to adhere to initial POC   [x]Other: pt on track at ~ 6 weeks  S/p RCR. Will continue to increase ROM and strength per protocol     Date range of previous POC Visits: 7/3-    Total Visits: 9          Physical Therapy Daily Treatment Note  Date:  2018    Patient Name:  Wayne Martinez    :  1950  MRN: [id number]  Restrictions/Precautions:    Medical/Treatment Diagnosis Information:  Diagnosis: M75.122 (ICD-10-CM) - Complete tear of left rotator cuff S43.432D (ICD-10-CM) - Superior glenoid labrum lesion of left shoulder, subsequent encounter  Treatment Diagnosis: decreased L shoulder ROM, decreased L shoulder strength,  L shoulder pain  Procedure performed:   1. Left shoulder diagnostic arthroscopy  2.   Left

## 2018-08-13 RX ORDER — ESZOPICLONE 1 MG/1
1 TABLET, FILM COATED ORAL NIGHTLY PRN
Qty: 30 TABLET | Refills: 0 | Status: SHIPPED | OUTPATIENT
Start: 2018-08-13 | End: 2018-08-31 | Stop reason: SDUPTHER

## 2018-08-14 ENCOUNTER — HOSPITAL ENCOUNTER (OUTPATIENT)
Dept: PHYSICAL THERAPY | Age: 68
Setting detail: THERAPIES SERIES
Discharge: HOME | End: 2018-08-14
Payer: MEDICARE

## 2018-08-14 PROCEDURE — 97110 THERAPEUTIC EXERCISES: CPT | Performed by: PHYSICAL THERAPIST

## 2018-08-14 PROCEDURE — 97530 THERAPEUTIC ACTIVITIES: CPT | Performed by: PHYSICAL THERAPIST

## 2018-08-14 PROCEDURE — 97140 MANUAL THERAPY 1/> REGIONS: CPT | Performed by: PHYSICAL THERAPIST

## 2018-08-14 NOTE — FLOWSHEET NOTE
TOWARDS  2. Patient will demonstrate increased AROM to [facility]  to allow for proper joint functioning as indicated by patients Functional Deficits. 8-12 weeksNOT MET  3. Patient will demonstrate an increase in Strength to good scapular and core control  for UE to allow for proper functional mobility as indicated by patients Functional Deficits. 6 moNOT MET  4. Patient will return to functional activities without increased symptoms or restriction. Light adls 2-3 PROGRESSING TOWARDS heavy adls 6 mo NOT MET                   Progression Towards Functional goals:  [x] Patient is progressing as expected towards functional goals listed. [] Progression is slowed due to complexities listed. [] Progression has been slowed due to co-morbidities. [] Plan just implemented, too soon to assess goals progression  [] Other:     ASSESSMENT:      Treatment/Activity Tolerance:  [x] Patient tolerated treatment well  8/14 [] Patient limited by fatique  [] Patient limited by pain  [] Patient limited by other medical complications  [x] Other: reassured her increased soreness is probably from DC of sling. Reviewed activity moderation 8/14      Patient education:7/3 reviewed diagnosis, POC, HEP, RCR restrictions.   Pt reports understanding    Prognosis: [x] Good [] Fair  [] Poor    Patient Requires Follow-up: [x] Yes  [] No    PLAN: 1-2 days per week for 6 Weeks:8/7-9/16/2018  [x] Continue per plan of care [] Alter current plan (see comments)  [] Plan of care initiated [] Hold pending MD visit [] Discharge    Electronically signed by: Maximo Kevin PT,

## 2018-08-16 ENCOUNTER — HOSPITAL ENCOUNTER (OUTPATIENT)
Dept: PHYSICAL THERAPY | Age: 68
Setting detail: THERAPIES SERIES
Discharge: HOME | End: 2018-08-16
Payer: MEDICARE

## 2018-08-16 PROCEDURE — 97110 THERAPEUTIC EXERCISES: CPT | Performed by: PHYSICAL THERAPIST

## 2018-08-16 PROCEDURE — 97530 THERAPEUTIC ACTIVITIES: CPT | Performed by: PHYSICAL THERAPIST

## 2018-08-16 PROCEDURE — 97140 MANUAL THERAPY 1/> REGIONS: CPT | Performed by: PHYSICAL THERAPIST

## 2018-08-21 ENCOUNTER — APPOINTMENT (OUTPATIENT)
Dept: PHYSICAL THERAPY | Age: 68
End: 2018-08-21
Payer: MEDICARE

## 2018-08-21 ENCOUNTER — HOSPITAL ENCOUNTER (OUTPATIENT)
Dept: PHYSICAL THERAPY | Age: 68
Setting detail: THERAPIES SERIES
Discharge: HOME | End: 2018-08-21
Payer: MEDICARE

## 2018-08-21 PROCEDURE — 97140 MANUAL THERAPY 1/> REGIONS: CPT | Performed by: PHYSICAL THERAPIST

## 2018-08-21 PROCEDURE — 97110 THERAPEUTIC EXERCISES: CPT | Performed by: PHYSICAL THERAPIST

## 2018-08-21 PROCEDURE — 97530 THERAPEUTIC ACTIVITIES: CPT | Performed by: PHYSICAL THERAPIST

## 2018-08-21 NOTE — FLOWSHEET NOTE
Flexion [id number]    UE [place] ER at 0 26w06lbw to 40*  only Start7/9 7/13 reviewed technique   Pulleys Flexion [id number] start7/31   Pendulum 81qzdl8    Elbow PROM     Isometrics     Retraction 3x10     x30    shrugs 3x10    Flexion     Abduction     External Rotation     Internal Rotation     Biceps     Triceps          PRE's     Flexion     Abduction     External Rotation celeste [id number] at wall 25% effort. Unable to perform step outs with red tband    SL 2x10 ^8/7        ^8/14   Internal Rotation     Shrugs     EXT     Reverse Flys     Serratus     Horizontal Abd with ER     Biceps     Triceps     Retraction Bent over x15 start8/7        Cable Column/Theraband     External Rotation     Internal Rotation 2x10 red start8/21   Shrugs     Lats     Ext Red 2x10 start8/16   Flex     Scapular Retraction Red 2x10 start8/16   BIC 2x10 1# start8/16   TRIC Red 2x10  start8/21   PNF          Dynamic Stability          Plyoback          Manual interventions     PROM  15 min flexion,scaption and ER @ 65*abd 7/23              Therapeutic Exercise and NMR EXR  [x] (48564) Provided verbal/tactile cueing for activities related to strengthening, flexibility, endurance, ROM  for improvements in scapular, scapulothoracic and UE control with self care, reaching, carrying, lifting, house/yardwork, driving/computer work.    [] (16960) Provided verbal/tactile cueing for activities related to improving balance, coordination, kinesthetic sense, posture, motor skill, proprioception  to assist with  scapular, scapulothoracic and UE control with self care, reaching, carrying, lifting, house/yardwork, driving/computer work.     Therapeutic Activities:    [] (59518 or 77586) Provided verbal/tactile cueing for activities related to improving balance, coordination, kinesthetic sense, posture, motor skill, proprioception and motor activation to allow for proper function of scapular, scapulothoracic and UE control with self care, carrying, lifting, driving/computer work. Home Exercise Program:    [x] (90573) Reviewed/Progressed HEP activities related to strengthening, flexibility, endurance, ROM of scapular, scapulothoracic and UE control with self care, reaching, carrying, lifting, house/yardwork, driving/computer work  [] (38779) Reviewed/Progressed HEP activities related to improving balance, coordination, kinesthetic sense, posture, motor skill, proprioception of scapular, scapulothoracic and UE control with self care, reaching, carrying, lifting, house/yardwork, driving/computer work      Manual Treatments:  PROM / STM / Oscillations-Mobs:  G-I, II, III, IV (PA's, Inf., Post.)  [x] (83713) Provided manual therapy to mobilize soft tissue/joints of cervical/CT, scapular GHJ and UE for the purpose of modulating pain, promoting relaxation,  increasing ROM, reducing/eliminating soft tissue swelling/inflammation/restriction, improving soft tissue extensibility and allowing for proper ROM for normal function with self care, reaching, carrying, lifting, house/yardwork, driving/computer work    Modalities:      Charges:  Timed Code Treatment Minutes: 40   Total Treatment Minutes: 830-915       [] EVAL (LOW) 61432 (typically 20 minutes face-to-face)  [] EVAL (MOD) 82304 (typically 30 minutes face-to-face)  [] EVAL (HIGH) 30396 (typically 45 minutes face-to-face)  [] RE-EVAL     [x] JY(67823) x  1   [] IONTO  [] NMR (19805) x      [] VASO  [x] Manual (98529) x  1    [] Other:  [x] TA x  1    [] [facility] Traction (54486)  [] ES(attended) (97189)      [] ES (un) (63692):     GOALS: (cut and paste from eval)  Patient stated goal: return to PLOF     Therapist goals for Patient:   Short Term Goals: To be achieved in: 2 weeks  1. Independent in HEP and progression per patient tolerance, in order to prevent re-injury. MET  2.  Patient will have a decrease in pain to facilitate improvement in movement, function, and ADLs as indicated by Functional Deficits. MET     Long Term Goals: To be achieved in:   1. Disability index score of 20% or less for the UEFS to assist with reaching prior level of function. 4 moPROGRESSING TOWARDS  2. Patient will demonstrate increased AROM to [facility]  to allow for proper joint functioning as indicated by patients Functional Deficits. 8-12 weeksNOT MET  3. Patient will demonstrate an increase in Strength to good scapular and core control  for UE to allow for proper functional mobility as indicated by patients Functional Deficits. 6 moNOT MET  4. Patient will return to functional activities without increased symptoms or restriction. Light adls 2-3 PROGRESSING TOWARDS heavy adls 6 mo NOT MET                   Progression Towards Functional goals:  [x] Patient is progressing as expected towards functional goals listed. [] Progression is slowed due to complexities listed. [] Progression has been slowed due to co-morbidities. [] Plan just implemented, too soon to assess goals progression  [] Other:     ASSESSMENT:      Treatment/Activity Tolerance:  [x] Patient tolerated treatment well  8/21 [] Patient limited by fatique  [] Patient limited by pain  [] Patient limited by other medical complications  [x] Other:.  toelrated increased today well 8/21      Patient education:7/3 reviewed diagnosis, POC, HEP, RCR restrictions. Pt reports understanding  8/16 Reviewed activity moderation. May use sling a few hours a day and note if that decreases c/o. Or wear sling at nigth and see if that help ache during day.   Ice daily 3-4 x day for 15-20 min 8/16        Prognosis: [x] Good [] Fair  [] Poor    Patient Requires Follow-up: [x] Yes  [] No    PLAN: 1-2 days per week for 6 Weeks:8/7-9/16/2018  [x] Continue per plan of care [] Alter current plan (see comments)  [] Plan of care initiated [] Hold pending MD visit [] Discharge    Electronically signed by: Luis Scales PT,

## 2018-08-23 ENCOUNTER — APPOINTMENT (OUTPATIENT)
Dept: PHYSICAL THERAPY | Age: 68
End: 2018-08-23
Payer: MEDICARE

## 2018-08-29 ENCOUNTER — HOSPITAL ENCOUNTER (OUTPATIENT)
Dept: PHYSICAL THERAPY | Age: 68
Setting detail: THERAPIES SERIES
Discharge: HOME | End: 2018-08-29
Payer: MEDICARE

## 2018-08-29 PROCEDURE — 97140 MANUAL THERAPY 1/> REGIONS: CPT | Performed by: PHYSICAL THERAPIST

## 2018-08-29 PROCEDURE — 97110 THERAPEUTIC EXERCISES: CPT | Performed by: PHYSICAL THERAPIST

## 2018-08-29 PROCEDURE — 97530 THERAPEUTIC ACTIVITIES: CPT | Performed by: PHYSICAL THERAPIST

## 2018-08-30 ENCOUNTER — PATIENT MESSAGE (OUTPATIENT)
Dept: INTERNAL MEDICINE CLINIC | Age: 68
End: 2018-08-30

## 2018-08-30 NOTE — TELEPHONE ENCOUNTER
Ok to call pharmacy and override instructions to allow early refill for vacation . Statement Selected

## 2018-08-30 NOTE — TELEPHONE ENCOUNTER
[facility] for patient to get a early refill ?  Script states do not refill before 9/5/2018

## 2018-08-31 ENCOUNTER — PATIENT MESSAGE (OUTPATIENT)
Dept: INTERNAL MEDICINE CLINIC | Age: 68
End: 2018-08-31

## 2018-08-31 ENCOUNTER — TELEPHONE (OUTPATIENT)
Dept: INTERNAL MEDICINE CLINIC | Age: 68
End: 2018-08-31

## 2018-08-31 ENCOUNTER — APPOINTMENT (OUTPATIENT)
Dept: PHYSICAL THERAPY | Age: 68
End: 2018-08-31
Payer: MEDICARE

## 2018-08-31 DIAGNOSIS — G47.00 INSOMNIA, UNSPECIFIED TYPE: ICD-10-CM

## 2018-08-31 RX ORDER — ESZOPICLONE 1 MG/1
TABLET, FILM COATED ORAL
Qty: 30 TABLET | Refills: 0 | OUTPATIENT
Start: 2018-08-31

## 2018-08-31 RX ORDER — ESZOPICLONE 1 MG/1
1 TABLET, FILM COATED ORAL NIGHTLY PRN
Qty: 30 TABLET | Refills: 0 | Status: SHIPPED | OUTPATIENT
Start: 2018-08-31 | End: 2018-10-08 | Stop reason: SDUPTHER

## 2018-08-31 NOTE — TELEPHONE ENCOUNTER
From: Sharonda García  To: Ozzie Moe, [place]  Sent: 8/31/2018 9:43 AM EDT  Subject: RE:prescrition    [facility] for tomorrow! Please call them so I can  on Saturday  [facility] you  ----- Message -----  From: Kwaku STEPHENS  Sent: 8/30/2018 2:36 PM EDT  To: Sharonda García  Subject: RE:prescrition  I called your pharmacy and you can refill your script on Saturday.     ----- Message -----   From: Sharonda García   Sent: 8/29/2018 11:36 PM EDT   To: HEMANT STEPHENS  Subject: RE:prescrition    Hi,   Please call [facility] to refill my prescription, the bottle says no refill. I am leaving town on Sunday and do not have enough for my week. Thank you  ----- Message -----  From: Shalini Bal  Sent: 8/13/2018 10:34 AM EDT  To: Sharonda García  Subject: RE:prescrition  I will send a message to Dr. Fran Webb but typically this medication is only refilled monthly.    ----- Message -----   From: Sharonad García   Sent: 8/9/2018 10:43 PM EDT   To: HEMANT STEPHENS  Subject: RE:prescrition    Thank you, please extend the refills so it is on auto refill with [facility]. I was only able to pick it up wnen I had no pills left. This bottle says no refills  ----- Message -----  From: HEMANT STEPHENS  Sent: 8/6/2018 1:58 PM EDT  To: Sharonda García  Subject: prescrition  Hello,    Your prescription For Donna Barrier has been sent.

## 2018-09-10 ENCOUNTER — PATIENT MESSAGE (OUTPATIENT)
Dept: INTERNAL MEDICINE CLINIC | Age: 68
End: 2018-09-10

## 2018-09-11 ENCOUNTER — APPOINTMENT (OUTPATIENT)
Dept: PHYSICAL THERAPY | Age: 68
End: 2018-09-11
Payer: MEDICARE

## 2018-09-11 ENCOUNTER — PATIENT MESSAGE (OUTPATIENT)
Dept: INTERNAL MEDICINE CLINIC | Age: 68
End: 2018-09-11

## 2018-09-13 ENCOUNTER — OFFICE VISIT (OUTPATIENT)
Dept: ORTHOPEDIC SURGERY | Age: 68
End: 2018-09-13

## 2018-09-13 VITALS
DIASTOLIC BLOOD PRESSURE: 78 MMHG | SYSTOLIC BLOOD PRESSURE: 124 MMHG | BODY MASS INDEX: 27.31 KG/M2 | WEIGHT: 160 LBS | HEIGHT: 64 IN

## 2018-09-13 DIAGNOSIS — M75.122 COMPLETE TEAR OF LEFT ROTATOR CUFF: Primary | ICD-10-CM

## 2018-09-13 PROCEDURE — 99024 POSTOP FOLLOW-UP VISIT: CPT | Performed by: ORTHOPAEDIC SURGERY

## 2018-09-13 RX ORDER — PREDNISONE 10 MG/1
10 TABLET ORAL DAILY
Qty: 30 TABLET | Refills: 0 | Status: SHIPPED | OUTPATIENT
Start: 2018-09-13 | End: 2018-09-23

## 2018-09-27 ENCOUNTER — HOSPITAL ENCOUNTER (OUTPATIENT)
Dept: PHYSICAL THERAPY | Age: 68
Setting detail: THERAPIES SERIES
Discharge: HOME | End: 2018-09-27
Payer: MEDICARE

## 2018-09-27 PROCEDURE — G8984 CARRY CURRENT STATUS: HCPCS | Performed by: PHYSICAL THERAPIST

## 2018-09-27 PROCEDURE — G8985 CARRY GOAL STATUS: HCPCS | Performed by: PHYSICAL THERAPIST

## 2018-09-27 PROCEDURE — 97140 MANUAL THERAPY 1/> REGIONS: CPT | Performed by: PHYSICAL THERAPIST

## 2018-09-27 PROCEDURE — 97110 THERAPEUTIC EXERCISES: CPT | Performed by: PHYSICAL THERAPIST

## 2018-09-27 PROCEDURE — 97530 THERAPEUTIC ACTIVITIES: CPT | Performed by: PHYSICAL THERAPIST

## 2018-09-27 NOTE — PLAN OF CARE
[facility] [place] [address], [address] [place] [address]  Phone: [phone]   Fax:     [phone]   Physical Therapy Re-Certification Plan of Care    Dear  ,    We had the pleasure of treating the following patient for physical therapy services at [address] [place] [address]. A summary of our findings can be found in the updated assessment below. This includes our plan of care. If you have any questions or concerns regarding these findings, please do not hesitate to contact me at the office phone number checked above. Thank you for the referral.     Physician Signature:________________________________Date:__________________  By signing above (or electronic signature), therapists plan is approved by physician      Overall Response to Treatment:   [x]Patient is responding well to treatment and improvement is noted with regards  to goals   []Patient should continue to improve in reasonable time if they continue HEP   []Patient has plateaued and is no longer responding to skilled PT intervention    []Patient is getting worse and would benefit from return to referring MD   []Patient unable to adhere to initial POC   [x]Other: need to continue  to increase strength to return to PLOF    Date range of previous POC Visits: -    Total Visits: 14           Physical Therapy Daily Treatment Note  Date:  2018    Patient Name:  Mark Cameron    :  1950  MRN: [id number]  Restrictions/Precautions:    Medical/Treatment Diagnosis Information:  Diagnosis: M75.122 (ICD-10-CM) - Complete tear of left rotator cuff S43.432D (ICD-10-CM) - Superior glenoid labrum lesion of left shoulder, subsequent encounter  Treatment Diagnosis: decreased L shoulder ROM, decreased L shoulder strength,  L shoulder pain  Procedure performed:   1. Left shoulder diagnostic arthroscopy  2.   Left shoulder arthroscopic subacromial decompression  3. Left shoulder arthroscopic distal clavicle excision  4. Left shoulder arthroscopic rotator cuff repair using a dual loaded 5.05 helical coil and 2 dual loaded Multi-fix S lateral row anchors  5. Left shoulder arthroscopic rotator cuff augmentation with Regeneten augmentation graft large graft size and 8 soft tissue staples  6. Left shoulder long head of the biceps tenodesis using 2 dual loaded ultra braid sutures tied into the rotator cuff    Insurance/Certification information:  PT Insurance Information: [place]  Physician Information:  Referring Practitioner: Rosey Carcamo DO  Plan of care signed (Y/N):     Date of Patient follow up with Physician:     G-Code (if applicable):      Date G-Code Applied:  41% 8/7  PT G-Codes  Functional Assessment Tool Used: UEFS  Score: 16%  Functional Limitation: Carrying, moving and handling objects  Carrying, Moving and Handling Objects Current Status (): At least 1 percent but less than 20 percent impaired, limited or restricted  Carrying, Moving and Handling Objects Goal Status (): 0 percent impaired, limited or restricted    Progress Note:  Next due by: Visit #20  Or week of 9/18    Latex Allergy:  [x]NO      []YES  Preferred Language for Healthcare:   [x]English       []other:    Visit # Insurance Allowable   14  9/27      eval 7/3 UNL 40copay     Pain level: 0/10  9/27    SUBJECTIVE:  9/27 saw MD he is pleased. Did put me on some steroids for 10 days to help with general soreness.   Now complete with meds  and feel pretty good this week    OBJECTIVE:    R handed             ROM PROM AROM  Comment     L R L R     Flexion ~170*  9/27    160  9/27       Abduction  scaption ~170* 9/27    145 9/27       ER  ~85* at 80* abd 9/27    75  9/27       IR  ~55*@ 65* abd 9/27           Other(cervical)             Other                    Strength 9/27 L R Comment   Flexion  3+       Abduction  3+       ER  3+       IR  4       Supraspinatus         Upper Trap         Lower Trap         Mid Trap         Rhomboids         Biceps  4       Triceps  4       Horizontal Abduction         Horizontal Adduction         Lats            Special Tests Results/Comment   Neurologic Signs Neg 7/3   Functional Reach        Reflexes/Sensation:               [x]Dermatomes/Myotomes intact 7/3               []Reflexes equal and normal bilaterally              []Other:     Joint mobility: not assessed due to recent surgery 7/3              []Normal               []Hypo              []Hyper     Palpation:      Functional Mobility/Transfers: modified I with mild protective posture9/27     Posture: slouched but otherwise wfl for  79 yr old female,8/7     Bandages/Dressings/Incisions: healed 9/27     Gait: (include devices/WB status): WNL with slight protective posture L shoulder 9/27                          [x] Patient history, allergies, meds reviewed. Medical chart reviewed. See intake form. 7/3     Review Of Systems (ROS):  [x]Performed Review of systems (Integumentary, CardioPulmonary, Neurological) by intake and observation. Intake form has been scanned into medical record. Patient has been instructed to contact their primary care physician regarding ROS issues if not already being addressed at this time.   7/3         RESTRICTIONS/PRECAUTIONS: RCR protocol, bicep tenodesis protocol    Exercises/Interventions:   Exercises:  Exercise/Equipment Resistance/Repetitions Other comments   Stretching/PROM     Supine flexion pt grab wrist   Cane supine x15    2x10 Start7/13    ^8/7   Table Slides Flexion 42h64kam    UE [place] ER at 0 52v99cro to 40*  only Start7/9 7/13 reviewed technique   Pulleys Flexion 52p55ase start7/31   Pendulum 60gmuo7    Supine butterfly 1 min start9/27   Towel IR  68w31wib start9/27   Isometrics     Retraction 3x10     x30    shrugs 3x10    Flexion     Abduction     External Rotation     Internal Rotation     Biceps     Triceps          PRE's     Flexion Supine 2x10 1# start9/27   Abduction SL 2x10 1#  start9/27   External Rotation celeste 4taxl75 at wall 50% effort. SL 2x10 1# ^8/7        ^8/29   Internal Rotation     Shrugs     EXT     Reverse Flys     Serratus     Horizontal Abd with ER     Biceps     Triceps     Retraction Bent over x15 start8/7        Cable Column/Theraband     External Rotation Yellow B 3x10  ^9/27   Internal Rotation 3x10 red start8/21   Shrugs     Lats     Ext Red 3x10 start8/16   Flex     Scapular Retraction Red 3x10 start8/16   BIC 3x10 2/3# ^9/27   TRIC green 3x10  ^9/27   PNF          Dynamic Stability          Plyoback          Manual interventions     PROM  15 min flexion,scaption and ER @ 65*abd 7/23              Therapeutic Exercise and NMR EXR  [x] (71282) Provided verbal/tactile cueing for activities related to strengthening, flexibility, endurance, ROM  for improvements in scapular, scapulothoracic and UE control with self care, reaching, carrying, lifting, house/yardwork, driving/computer work.    [] (30070) Provided verbal/tactile cueing for activities related to improving balance, coordination, kinesthetic sense, posture, motor skill, proprioception  to assist with  scapular, scapulothoracic and UE control with self care, reaching, carrying, lifting, house/yardwork, driving/computer work. Therapeutic Activities:    [x] (24731 or 16343) Provided verbal/tactile cueing for activities related to improving balance, coordination, kinesthetic sense, posture, motor skill, proprioception and motor activation to allow for proper function of scapular, scapulothoracic and UE control with self care, carrying, lifting, driving/computer work.      Home Exercise Program:    [x] (91748) Reviewed/Progressed HEP activities related to strengthening, flexibility, endurance, ROM of scapular, scapulothoracic and UE control with self care, reaching, carrying, lifting, house/yardwork, driving/computer work  [] (67664) Reviewed/Progressed HEP activities related to improving balance, coordination, kinesthetic sense, posture, motor skill, proprioception of scapular, scapulothoracic and UE control with self care, reaching, carrying, lifting, house/yardwork, driving/computer work      Manual Treatments:  PROM / STM / Oscillations-Mobs:  G-I, II, III, IV (PA's, Inf., Post.)  [x] (70101) Provided manual therapy to mobilize soft tissue/joints of cervical/CT, scapular GHJ and UE for the purpose of modulating pain, promoting relaxation,  increasing ROM, reducing/eliminating soft tissue swelling/inflammation/restriction, improving soft tissue extensibility and allowing for proper ROM for normal function with self care, reaching, carrying, lifting, house/yardwork, driving/computer work    Modalities:      Charges:  Timed Code Treatment Minutes: 40   Total Treatment Minutes: 1041-1602       [] EVAL (LOW) 48609 (typically 20 minutes face-to-face)  [] EVAL (MOD) 58749 (typically 30 minutes face-to-face)  [] EVAL (HIGH) 02774 (typically 45 minutes face-to-face)  [] RE-EVAL     [x] VV(44330) x  1   [] IONTO  [] NMR (66149) x      [] VASO  [x] Manual (40204) x  1    [] Other:  [x] TA x  1    [] Mech Traction (00097)  [] ES(attended) (87746)      [] ES (un) (69291):     GOALS: (cut and paste from eval)  Patient stated goal: return to PLOF     Therapist goals for Patient:   Short Term Goals: To be achieved in: 2 weeks  1. Independent in HEP and progression per patient tolerance, in order to prevent re-injury. MET  2. Patient will have a decrease in pain to facilitate improvement in movement, function, and ADLs as indicated by Functional Deficits. MET     Long Term Goals: To be achieved in:   1. Disability index score of 20% or less for the UEFS to assist with reaching prior level of function. 4 moMET  2. Patient will demonstrate increased AROM to [facility]  to allow for proper joint functioning as indicated by patients Functional Deficits. 8-12 weeksAMOST MET  3.  Patient will demonstrate an increase in Strength to good scapular and core control  for UE to allow for proper functional mobility as indicated by patients Functional Deficits. 6 moPROGRESSING TOWARDS  4. Patient will return to functional activities without increased symptoms or restriction. Light adls 2-3 MET heavy adls 6 mo PROGRESSING TOWARDS                   Progression Towards Functional goals:  [x] Patient is progressing as expected towards functional goals listed. [] Progression is slowed due to complexities listed. [] Progression has been slowed due to co-morbidities. [] Plan just implemented, too soon to assess goals progression  [] Other:     ASSESSMENT:      Treatment/Activity Tolerance:  [x] Patient tolerated treatment well  9/27 [] Patient limited by fatique  [] Patient limited by pain  [] Patient limited by other medical complications  [x] Other:. SEE POC ABOVE         Patient education:7/3 reviewed diagnosis, POC, HEP, RCR restrictions. Pt reports understanding  8/16 Reviewed activity moderation. May use sling a few hours a day and note if that decreases c/o. Or wear sling at nigth and see if that help ache during day.   Ice daily 3-4 x day for 15-20 min 8/16        Prognosis: [x] Good [] Fair  [] Poor    Patient Requires Follow-up: [x] Yes  [] No    PLAN: 1 days per week for 8 Weeks:9/27-10/15/2018  [x] Continue per plan of care [] Alter current plan (see comments)  [] Plan of care initiated [] Hold pending MD visit [] Discharge    Electronically signed by: Samanta Mccormack PT,

## 2018-10-08 DIAGNOSIS — G47.00 INSOMNIA, UNSPECIFIED TYPE: ICD-10-CM

## 2018-10-08 RX ORDER — ESZOPICLONE 1 MG/1
1 TABLET, FILM COATED ORAL NIGHTLY PRN
Qty: 30 TABLET | Refills: 0 | Status: SHIPPED | OUTPATIENT
Start: 2018-10-08 | End: 2018-11-05 | Stop reason: SDUPTHER

## 2018-10-09 ENCOUNTER — OFFICE VISIT (OUTPATIENT)
Dept: INTERNAL MEDICINE CLINIC | Age: 68
End: 2018-10-09
Payer: MEDICARE

## 2018-10-09 VITALS
SYSTOLIC BLOOD PRESSURE: 112 MMHG | BODY MASS INDEX: 27.98 KG/M2 | WEIGHT: 163 LBS | HEART RATE: 74 BPM | DIASTOLIC BLOOD PRESSURE: 64 MMHG | OXYGEN SATURATION: 98 %

## 2018-10-09 DIAGNOSIS — E11.9 TYPE 2 DIABETES MELLITUS WITHOUT COMPLICATION, WITHOUT LONG-TERM CURRENT USE OF INSULIN (HCC): ICD-10-CM

## 2018-10-09 DIAGNOSIS — F32.A ANXIETY AND DEPRESSION: ICD-10-CM

## 2018-10-09 DIAGNOSIS — E11.9 TYPE 2 DIABETES MELLITUS WITHOUT COMPLICATION, WITHOUT LONG-TERM CURRENT USE OF INSULIN (HCC): Primary | ICD-10-CM

## 2018-10-09 DIAGNOSIS — F41.9 ANXIETY AND DEPRESSION: ICD-10-CM

## 2018-10-09 DIAGNOSIS — Z78.0 POSTMENOPAUSAL: ICD-10-CM

## 2018-10-09 DIAGNOSIS — Z12.39 SCREENING FOR BREAST CANCER: ICD-10-CM

## 2018-10-09 DIAGNOSIS — G47.00 INSOMNIA, UNSPECIFIED TYPE: ICD-10-CM

## 2018-10-09 PROBLEM — M75.122 COMPLETE TEAR OF LEFT ROTATOR CUFF: Status: RESOLVED | Noted: 2018-04-26 | Resolved: 2018-10-09

## 2018-10-09 LAB
ANION GAP SERPL CALCULATED.3IONS-SCNC: 14 MMOL/L (ref 3–16)
BUN BLDV-MCNC: 14 MG/DL (ref 7–20)
CALCIUM SERPL-MCNC: 9.8 MG/DL (ref 8.3–10.6)
CHLORIDE BLD-SCNC: 102 MMOL/L (ref 99–110)
CHOLESTEROL, FASTING: 180 MG/DL (ref 0–199)
CO2: 26 MMOL/L (ref 21–32)
CREAT SERPL-MCNC: 0.7 MG/DL (ref 0.6–1.2)
GFR AFRICAN AMERICAN: >60
GFR NON-AFRICAN AMERICAN: >60
GLUCOSE FASTING: 153 MG/DL (ref 70–99)
HBA1C MFR BLD: 7.2 %
HDLC SERPL-MCNC: 61 MG/DL (ref 40–60)
LDL CHOLESTEROL CALCULATED: 96 MG/DL
POTASSIUM SERPL-SCNC: 4.9 MMOL/L (ref 3.5–5.1)
SODIUM BLD-SCNC: 142 MMOL/L (ref 136–145)
TRIGLYCERIDE, FASTING: 114 MG/DL (ref 0–150)
VLDLC SERPL CALC-MCNC: 23 MG/DL

## 2018-10-09 PROCEDURE — 99214 OFFICE O/P EST MOD 30 MIN: CPT | Performed by: INTERNAL MEDICINE

## 2018-10-09 PROCEDURE — G0008 ADMIN INFLUENZA VIRUS VAC: HCPCS | Performed by: INTERNAL MEDICINE

## 2018-10-09 PROCEDURE — 90662 IIV NO PRSV INCREASED AG IM: CPT | Performed by: INTERNAL MEDICINE

## 2018-10-09 PROCEDURE — 83036 HEMOGLOBIN GLYCOSYLATED A1C: CPT | Performed by: INTERNAL MEDICINE

## 2018-10-26 RX ORDER — PRAVASTATIN SODIUM 40 MG
40 TABLET ORAL DAILY
Qty: 90 TABLET | Refills: 0 | Status: SHIPPED | OUTPATIENT
Start: 2018-10-26 | End: 2019-02-18 | Stop reason: SDUPTHER

## 2018-10-26 RX ORDER — PRAVASTATIN SODIUM 40 MG
40 TABLET ORAL DAILY
Qty: 30 TABLET | Refills: 0 | Status: SHIPPED | OUTPATIENT
Start: 2018-10-26 | End: 2018-10-26 | Stop reason: SDUPTHER

## 2018-11-01 ENCOUNTER — OFFICE VISIT (OUTPATIENT)
Dept: ORTHOPEDIC SURGERY | Age: 68
End: 2018-11-01
Payer: MEDICARE

## 2018-11-01 VITALS
WEIGHT: 163.14 LBS | BODY MASS INDEX: 27.85 KG/M2 | DIASTOLIC BLOOD PRESSURE: 65 MMHG | SYSTOLIC BLOOD PRESSURE: 112 MMHG | HEIGHT: 64 IN

## 2018-11-01 DIAGNOSIS — M75.122 COMPLETE TEAR OF LEFT ROTATOR CUFF: Primary | ICD-10-CM

## 2018-11-01 PROCEDURE — 99213 OFFICE O/P EST LOW 20 MIN: CPT | Performed by: ORTHOPAEDIC SURGERY

## 2018-11-01 NOTE — PROGRESS NOTES
11/1/18  History of Present Illness:  Fabian Brink is a 79 y.o. female    Patient's chief complaint in the office today includes: Recheck evaluation left shoulder here today for a proximally a 4 months postop rotator cuff repair evaluation    Location left Shoulder  Severity almost completely resolved  Duration she's a little more than 4 months postop  Associated sign/symptoms good strength good range of motion no pain    Medical History  Patient's medications, allergies, past medical, surgical, social and family histories were reviewed and updated as appropriate. Review of Systems  No rash  No numbness  No tingling  No fevers  No depression    Pertinent items are noted in HPI  Review of systems reviewed from Patient History Form dated on 6/22/18 and available in the patient's chart under the Media tab. No change in her medical history form                                         Examination    General Exam:   Vitals: Blood pressure 112/65, height 5' 4.02\" (1.626 m), weight 163 lb 2.3 oz (74 kg), not currently breastfeeding. Constitutional: Patient is adequately groomed with no evidence of malnutrition  Mental Status: The patient is oriented to time, place and person. The patient's mood and affect are appropriate. PHYSICAL EXAM:      Shoulder Examination  Inspection:  No swelling, no deformity, no erythema, no drainage, no signs of infection     Palpation:  Palpation reveals no effusion minimal pain, no warmth,     Range of Motion:  genital range of motion with no crepitus passive motion to 150° of forward flextion    Strength:  Intact strength with internal and external rotation along with  supraspinatus isolation bilaterally, Shoulder shrug is 5 over 5 , cervical spine strength is excellent, flexion extension at the elbow is 5 over 5 wrist and hand strength is equal bilaterally no winging no muscle atrophy.    Palpation:  Lateral deltoid no pain to palpation  [facility] demonstrates no swelling no effusion no pain. There is full active and passive range of motion bilaterally. Strength is excellent with internal rotation against resistance external rotation against resistance supraspinatus isolation against resistance. Shoulder shrug strength is 5 over 5 equal bilaterally. Radial ulnar and median nerve function is intact. Capillary refill is brisk. Elbow motion finger and wrist motion is full equal bilaterally. Deep tendon reflexes of the Brachial radialis, biceps, tricepsAre all +2/4 equal bilaterally. Cervical spine range of motion is full without pain negative Spurling's test.  Load-and-shift test is negative. Crank test is negative. Apprehension and relocation is negative. Anterior and posterior glide are equal bilaterally. Negative sulcus sign. No signs of any significant multidirectional instability. There is no scapular winging. There is no muscle atrophy of the latissimus dorsi, the deltoid, the periscapular musculature,The trapezius musculature or the pectoralis musculature. Negative Neer's test, negative Mosley test, no pain with crossarm elevation. Abduction --150 degrees  Flexion-- 180 degrees  Extension-- between 45-60 degrees  Latera/external  rotation --close to 90 degrees  Medial/ internal rotation -- between 70-90 degree      Reflex: upper extremity: Deep tendon reflexes of the biceps, triceps, brachioradialis +2/4 equal bilaterally  Lower extremity: +2/4 and equal bilaterally for patella and Achilles    Additional Comments:       Cervical spine exam demonstrates no  Radiculopathy no reproduction of the symptomology. Range of motion is normal without pain or radiculopathy and does not cause shoulder pain. Additional Examinations:  Thoracic Spine: Examination of the thoracic spine does not show any tenderness, deformity or injury. Range of motion is unremarkable. There is no gross instability. There are no rashes, ulcerations or lesions.

## 2018-11-12 ENCOUNTER — CARE COORDINATION (OUTPATIENT)
Dept: INTERNAL MEDICINE CLINIC | Age: 68
End: 2018-11-12

## 2018-11-16 ENCOUNTER — CARE COORDINATION (OUTPATIENT)
Dept: INTERNAL MEDICINE CLINIC | Age: 68
End: 2018-11-16

## 2018-11-23 ENCOUNTER — CARE COORDINATION (OUTPATIENT)
Dept: INTERNAL MEDICINE CLINIC | Age: 68
End: 2018-11-23

## 2018-12-03 DIAGNOSIS — G47.00 INSOMNIA, UNSPECIFIED TYPE: ICD-10-CM

## 2018-12-03 RX ORDER — ESZOPICLONE 1 MG/1
TABLET, FILM COATED ORAL
Qty: 30 TABLET | Refills: 0 | Status: SHIPPED | OUTPATIENT
Start: 2018-12-03 | End: 2018-12-31 | Stop reason: SDUPTHER

## 2018-12-06 RX ORDER — PRAVASTATIN SODIUM 40 MG
40 TABLET ORAL DAILY
Qty: 30 TABLET | Refills: 5 | Status: SHIPPED | OUTPATIENT
Start: 2018-12-06 | End: 2019-07-10 | Stop reason: SDUPTHER

## 2018-12-13 ENCOUNTER — CARE COORDINATION (OUTPATIENT)
Dept: CARE COORDINATION | Age: 68
End: 2018-12-13

## 2018-12-13 NOTE — CARE COORDINATION
RD outreach to remind patient of DM Class tomorrow 12/14/18 from 2-4 pm at PCP office. Patient confirmed. Will notify [facility] of outreach.

## 2018-12-14 ENCOUNTER — CARE COORDINATION (OUTPATIENT)
Dept: CARE COORDINATION | Age: 68
End: 2018-12-14

## 2018-12-17 DIAGNOSIS — M75.122 COMPLETE TEAR OF LEFT ROTATOR CUFF: ICD-10-CM

## 2018-12-17 RX ORDER — MELOXICAM 15 MG/1
15 TABLET ORAL DAILY
Qty: 90 TABLET | Refills: 3 | Status: SHIPPED | OUTPATIENT
Start: 2018-12-17

## 2018-12-17 RX ORDER — MELOXICAM 15 MG/1
15 TABLET ORAL DAILY
Qty: 30 TABLET | Refills: 3 | Status: SHIPPED | OUTPATIENT
Start: 2018-12-17 | End: 2018-12-17 | Stop reason: SDUPTHER

## 2018-12-20 ENCOUNTER — HOSPITAL ENCOUNTER (OUTPATIENT)
Dept: GENERAL RADIOLOGY | Age: 68
Discharge: HOME | End: 2018-12-20
Payer: MEDICARE

## 2018-12-20 ENCOUNTER — HOSPITAL ENCOUNTER (OUTPATIENT)
Dept: MAMMOGRAPHY | Age: 68
Discharge: HOME | End: 2018-12-20
Payer: MEDICARE

## 2018-12-20 DIAGNOSIS — Z12.39 SCREENING FOR BREAST CANCER: ICD-10-CM

## 2018-12-20 DIAGNOSIS — Z78.0 POSTMENOPAUSAL: ICD-10-CM

## 2018-12-20 PROCEDURE — 77080 DXA BONE DENSITY AXIAL: CPT

## 2018-12-20 PROCEDURE — 77063 BREAST TOMOSYNTHESIS BI: CPT

## 2018-12-31 DIAGNOSIS — G47.00 INSOMNIA, UNSPECIFIED TYPE: ICD-10-CM

## 2019-01-01 RX ORDER — ESZOPICLONE 1 MG/1
1 TABLET, FILM COATED ORAL NIGHTLY
Qty: 30 TABLET | Refills: 0 | Status: SHIPPED | OUTPATIENT
Start: 2019-01-01 | End: 2019-01-28 | Stop reason: SDUPTHER

## 2019-01-04 ENCOUNTER — CARE COORDINATION (OUTPATIENT)
Dept: CARE COORDINATION | Age: 69
End: 2019-01-04

## 2019-01-22 ENCOUNTER — CARE COORDINATION (OUTPATIENT)
Dept: INTERNAL MEDICINE CLINIC | Age: 69
End: 2019-01-22

## 2019-02-19 ENCOUNTER — OFFICE VISIT (OUTPATIENT)
Dept: INTERNAL MEDICINE CLINIC | Age: 69
End: 2019-02-19
Payer: MEDICARE

## 2019-02-19 VITALS
SYSTOLIC BLOOD PRESSURE: 124 MMHG | HEART RATE: 74 BPM | OXYGEN SATURATION: 98 % | DIASTOLIC BLOOD PRESSURE: 78 MMHG | WEIGHT: 167 LBS | BODY MASS INDEX: 28.65 KG/M2

## 2019-02-19 DIAGNOSIS — E11.9 TYPE 2 DIABETES MELLITUS WITHOUT COMPLICATION, WITHOUT LONG-TERM CURRENT USE OF INSULIN (HCC): Primary | ICD-10-CM

## 2019-02-19 DIAGNOSIS — Z51.81 MEDICATION MONITORING ENCOUNTER: ICD-10-CM

## 2019-02-19 DIAGNOSIS — F32.A ANXIETY AND DEPRESSION: ICD-10-CM

## 2019-02-19 DIAGNOSIS — F41.9 ANXIETY AND DEPRESSION: ICD-10-CM

## 2019-02-19 DIAGNOSIS — Z23 NEED FOR PROPHYLACTIC VACCINATION AGAINST STREPTOCOCCUS PNEUMONIAE (PNEUMOCOCCUS): ICD-10-CM

## 2019-02-19 DIAGNOSIS — G47.00 INSOMNIA, UNSPECIFIED TYPE: ICD-10-CM

## 2019-02-19 LAB
CREATININE URINE: 92.1 MG/DL (ref 28–259)
HBA1C MFR BLD: 6.7 %
MICROALBUMIN UR-MCNC: 4 MG/DL
MICROALBUMIN/CREAT UR-RTO: 43.4 MG/G (ref 0–30)

## 2019-02-19 PROCEDURE — G0009 ADMIN PNEUMOCOCCAL VACCINE: HCPCS | Performed by: INTERNAL MEDICINE

## 2019-02-19 PROCEDURE — 90732 PPSV23 VACC 2 YRS+ SUBQ/IM: CPT | Performed by: INTERNAL MEDICINE

## 2019-02-19 PROCEDURE — 99214 OFFICE O/P EST MOD 30 MIN: CPT | Performed by: INTERNAL MEDICINE

## 2019-02-19 PROCEDURE — 83036 HEMOGLOBIN GLYCOSYLATED A1C: CPT | Performed by: INTERNAL MEDICINE

## 2019-02-19 RX ORDER — TRAZODONE HYDROCHLORIDE 50 MG/1
TABLET ORAL
Qty: 180 TABLET | Refills: 2 | OUTPATIENT
Start: 2019-02-19

## 2019-02-19 RX ORDER — TRAZODONE HYDROCHLORIDE 50 MG/1
50-100 TABLET ORAL NIGHTLY PRN
Qty: 60 TABLET | Refills: 2 | Status: SHIPPED | OUTPATIENT
Start: 2019-02-19 | End: 2019-05-17 | Stop reason: SDUPTHER

## 2019-02-19 ASSESSMENT — ENCOUNTER SYMPTOMS
SHORTNESS OF BREATH: 0
CHEST TIGHTNESS: 0

## 2019-02-19 ASSESSMENT — PATIENT HEALTH QUESTIONNAIRE - PHQ9
2. FEELING DOWN, DEPRESSED OR HOPELESS: 0
SUM OF ALL RESPONSES TO PHQ9 QUESTIONS 1 & 2: 0
SUM OF ALL RESPONSES TO PHQ QUESTIONS 1-9: 0
1. LITTLE INTEREST OR PLEASURE IN DOING THINGS: 0
SUM OF ALL RESPONSES TO PHQ QUESTIONS 1-9: 0

## 2019-03-11 RX ORDER — PRAVASTATIN SODIUM 40 MG
TABLET ORAL
Qty: 90 TABLET | Refills: 0 | Status: SHIPPED | OUTPATIENT
Start: 2019-03-11 | End: 2019-03-20

## 2019-03-20 ENCOUNTER — OFFICE VISIT (OUTPATIENT)
Dept: INTERNAL MEDICINE CLINIC | Age: 69
End: 2019-03-20
Payer: MEDICARE

## 2019-03-20 VITALS
HEART RATE: 68 BPM | WEIGHT: 166 LBS | DIASTOLIC BLOOD PRESSURE: 70 MMHG | OXYGEN SATURATION: 98 % | SYSTOLIC BLOOD PRESSURE: 116 MMHG | BODY MASS INDEX: 28.48 KG/M2

## 2019-03-20 DIAGNOSIS — T88.7XXA MEDICATION SIDE EFFECTS: Primary | ICD-10-CM

## 2019-03-20 DIAGNOSIS — F51.04 CHRONIC INSOMNIA: ICD-10-CM

## 2019-03-20 PROCEDURE — 99212 OFFICE O/P EST SF 10 MIN: CPT | Performed by: INTERNAL MEDICINE

## 2019-03-25 ENCOUNTER — OFFICE VISIT (OUTPATIENT)
Dept: ORTHOPEDIC SURGERY | Age: 69
End: 2019-03-25
Payer: MEDICARE

## 2019-03-25 VITALS — WEIGHT: 166.01 LBS | BODY MASS INDEX: 28.34 KG/M2 | HEIGHT: 64 IN

## 2019-03-25 DIAGNOSIS — M25.511 ACUTE PAIN OF RIGHT SHOULDER: Primary | ICD-10-CM

## 2019-03-25 PROCEDURE — 99214 OFFICE O/P EST MOD 30 MIN: CPT | Performed by: ORTHOPAEDIC SURGERY

## 2019-04-04 DIAGNOSIS — M67.911 DISORDER OF ROTATOR CUFF, RIGHT: Primary | ICD-10-CM

## 2019-04-04 DIAGNOSIS — M25.511 ACUTE PAIN OF RIGHT SHOULDER: ICD-10-CM

## 2019-04-04 PROCEDURE — L3670 SO ACRO/CLAV CAN WEB PRE OTS: HCPCS | Performed by: ORTHOPAEDIC SURGERY

## 2019-04-18 RX ORDER — MELOXICAM 15 MG/1
15 TABLET ORAL DAILY
Qty: 30 TABLET | Refills: 3 | Status: SHIPPED | OUTPATIENT
Start: 2019-04-18 | End: 2019-07-10 | Stop reason: CLARIF

## 2019-04-23 NOTE — PROGRESS NOTES
Name_______________________________________Printed:____________________  Date and time of surgery___4/30/19  0745_____________________Arrival Time:___0615  [facility]_____________   1. Do not eat or drink anything after 12 midnight (or____hours) prior to surgery. This includes no water, chewing gum or mints. Endoscopy patients follow your doctors bowel prep instructions,which may include taking part of prep after midnight. 2. Take the following pills with a small sip of water on the morning of surgery___________________________________________________                  Do not take blood pressure medications ending in pril or sartan the andrey prior to surgery or the morning of surgery_   3. Aspirin, Ibuprofen, Advil, Naproxen, Vitamin E and other Anti-inflammatory products should be stopped for 5 days before surgery or as directed by your physician. 4. Check with your Doctor regarding stopping Plavix, Coumadin,Eliquis, Lovenox,Effient,Pradaxa,Xarelto, Fragmin or other blood thinners and follow their instructions. 5. Do not smoke, and do not drink any alcoholic beverages 24 hours prior to surgery. This includes NA Beer. Refrain from the usage of any recreational drugs. 6. You may brush your teeth and gargle the morning of surgery. DO NOT SWALLOW WATER   7. You MUST make arrangements for a responsible adult to stay on site while you are here and take you home after your surgery. You will not be allowed to leave alone or drive yourself home. It is strongly suggested someone stay with you the first 24 hrs. Your surgery will be cancelled if you do not have a ride home. 8. A parent/legal guardian must accompany a child scheduled for surgery and plan to stay at the hospital until the child is discharged. Please do not bring other children with you.    9. Please wear simple, loose fitting clothing to the hospital.  Do not bring valuables (money, credit cards, checkbooks, etc.) Do not wear any makeup (including no eye makeup) or nail polish on your fingers or toes. 10. DO NOT wear any jewelry or piercings on day of surgery. All body piercing jewelry must be removed. 11. If you have ___dentures, they will be removed before going to the OR; we will provide you a container. If you wear ___contact lenses or ___glasses, they will be removed; please bring a case for them. 12. Please see your family doctor/pediatrician for a history & physical and/or concerning medications. Bring any test results/reports from your physician's office. PCP__________________Phone___________H&P Appt. Date________             13 If you  have a Living Will and Durable Power of  for Healthcare, please bring in a copy. 15. Notify your Surgeon if you develop any illness between now and surgery  time, cough, cold, fever, sore throat, nausea, vomiting, etc.  Please notify your surgeon if you experience dizziness, shortness of breath or blurred vision between now & the time of your surgery             15. DO NOT shave your operative site 96 hours prior to surgery. For face & neck surgery, men may use an electric razor 48 hours prior to surgery. 16. Shower the night before OR MORNING OF surgery with __X_Antibacterial soap ___Hibiclens             17. To provide excellent care visitors will be limited to one in the room at any given time. 18.  Please bring picture ID and insurance card. 19.  Visit our web site for additional information:  [email or web address]              20.During flu season no children under the age of 15 are permitted in the hospital for the safety of all patients.                               21. If you take a long acting insulin in the evening only  take half of your usual  dose the night  before your procedure              22. If you use a c-pap please bring DOS if staying overnight,             23.For your convenience [facility] has a pharmacy on site to fill your prescriptions. 24. If you use oxygen and have a portable tank please bring it  with you the DOS             25. Bring a complete list of all your medications with name and dose include any supplements. 26. Other__________________________________________   *Please call pre admission testing if you any further questions   Skinny Li     [id number] [facility]  [phone]   [address] [place] [address]       All above information reviewed with patient in person or by phone. Patient verbalizes understanding. All questions and concerns addressed.                                                                                                  Patient/Rep___PATIENT_________________                                                                                                                                    PRE OP INSTRUCTIONS

## 2019-04-24 ENCOUNTER — TELEPHONE (OUTPATIENT)
Dept: ORTHOPEDIC SURGERY | Age: 69
End: 2019-04-24

## 2019-04-24 NOTE — TELEPHONE ENCOUNTER
Auth: # [id number]    Date: 4/30/19  Type of SX:  Outpatient  Location: [facility]  CPT: [id number], [id number], [id number]   SX area: Rt shoulder

## 2019-04-25 DIAGNOSIS — M75.122 COMPLETE TEAR OF LEFT ROTATOR CUFF: Primary | ICD-10-CM

## 2019-04-25 RX ORDER — OXYCODONE HYDROCHLORIDE 10 MG/1
10 TABLET ORAL EVERY 8 HOURS PRN
Qty: 21 TABLET | Refills: 0 | Status: SHIPPED | OUTPATIENT
Start: 2019-04-30 | End: 2019-05-07

## 2019-04-25 RX ORDER — MELOXICAM 15 MG/1
15 TABLET ORAL DAILY
Qty: 90 TABLET | Refills: 3 | Status: SHIPPED | OUTPATIENT
Start: 2019-04-30 | End: 2019-07-10

## 2019-04-29 ENCOUNTER — SURG/PROC ORDERS (OUTPATIENT)
Dept: ORTHOPEDIC SURGERY | Age: 69
End: 2019-04-29

## 2019-04-29 RX ORDER — DOCUSATE SODIUM 100 MG/1
100 CAPSULE, LIQUID FILLED ORAL 2 TIMES DAILY
Status: CANCELLED | OUTPATIENT
Start: 2019-04-29

## 2019-04-30 ENCOUNTER — ANESTHESIA (OUTPATIENT)
Dept: OPERATING ROOM | Age: 69
End: 2019-04-30
Payer: MEDICARE

## 2019-04-30 ENCOUNTER — HOSPITAL ENCOUNTER (OUTPATIENT)
Age: 69
Setting detail: OUTPATIENT SURGERY
Discharge: HOME | End: 2019-04-30
Attending: ORTHOPAEDIC SURGERY | Admitting: ORTHOPAEDIC SURGERY
Payer: MEDICARE

## 2019-04-30 ENCOUNTER — ANESTHESIA EVENT (OUTPATIENT)
Dept: OPERATING ROOM | Age: 69
End: 2019-04-30
Payer: MEDICARE

## 2019-04-30 VITALS
TEMPERATURE: 98.4 F | HEIGHT: 64 IN | WEIGHT: 158 LBS | DIASTOLIC BLOOD PRESSURE: 69 MMHG | OXYGEN SATURATION: 98 % | RESPIRATION RATE: 18 BRPM | BODY MASS INDEX: 26.98 KG/M2 | SYSTOLIC BLOOD PRESSURE: 108 MMHG | HEART RATE: 80 BPM

## 2019-04-30 VITALS — DIASTOLIC BLOOD PRESSURE: 66 MMHG | TEMPERATURE: 97.7 F | SYSTOLIC BLOOD PRESSURE: 122 MMHG | OXYGEN SATURATION: 97 %

## 2019-04-30 LAB
GLUCOSE BLD-MCNC: 140 MG/DL (ref 70–99)
GLUCOSE BLD-MCNC: 209 MG/DL (ref 70–99)
HCT VFR BLD CALC: 42.9 % (ref 36–48)
HEMOGLOBIN: 14.4 G/DL (ref 12–16)
MCH RBC QN AUTO: 29.6 PG (ref 26–34)
MCHC RBC AUTO-ENTMCNC: 33.5 G/DL (ref 31–36)
MCV RBC AUTO: 88.4 FL (ref 80–100)
PDW BLD-RTO: 13.7 % (ref 12.4–15.4)
PERFORMED ON: ABNORMAL
PERFORMED ON: ABNORMAL
PLATELET # BLD: 105 K/UL (ref 135–450)
PMV BLD AUTO: 12.4 FL (ref 5–10.5)
RBC # BLD: 4.86 M/UL (ref 4–5.2)
WBC # BLD: 4.3 K/UL (ref 4–11)

## 2019-04-30 PROCEDURE — 6360000002 HC RX W HCPCS: Performed by: ORTHOPAEDIC SURGERY

## 2019-04-30 PROCEDURE — 2720000010 HC SURG SUPPLY STERILE: Performed by: ORTHOPAEDIC SURGERY

## 2019-04-30 PROCEDURE — 6360000002 HC RX W HCPCS: Performed by: ANESTHESIOLOGY

## 2019-04-30 PROCEDURE — 2500000003 HC RX 250 WO HCPCS: Performed by: ANESTHESIOLOGY

## 2019-04-30 PROCEDURE — 3600000014 HC SURGERY LEVEL 4 ADDTL 15MIN: Performed by: ORTHOPAEDIC SURGERY

## 2019-04-30 PROCEDURE — 3700000001 HC ADD 15 MINUTES (ANESTHESIA): Performed by: ORTHOPAEDIC SURGERY

## 2019-04-30 PROCEDURE — 76942 ECHO GUIDE FOR BIOPSY: CPT | Performed by: ANESTHESIOLOGY

## 2019-04-30 PROCEDURE — 2580000003 HC RX 258: Performed by: ANESTHESIOLOGY

## 2019-04-30 PROCEDURE — 7100000011 HC PHASE II RECOVERY - ADDTL 15 MIN: Performed by: ORTHOPAEDIC SURGERY

## 2019-04-30 PROCEDURE — 2580000003 HC RX 258: Performed by: NURSE ANESTHETIST, CERTIFIED REGISTERED

## 2019-04-30 PROCEDURE — 3700000000 HC ANESTHESIA ATTENDED CARE: Performed by: ORTHOPAEDIC SURGERY

## 2019-04-30 PROCEDURE — 3600000004 HC SURGERY LEVEL 4 BASE: Performed by: ORTHOPAEDIC SURGERY

## 2019-04-30 PROCEDURE — C1713 ANCHOR/SCREW BN/BN,TIS/BN: HCPCS | Performed by: ORTHOPAEDIC SURGERY

## 2019-04-30 PROCEDURE — 7100000001 HC PACU RECOVERY - ADDTL 15 MIN: Performed by: ORTHOPAEDIC SURGERY

## 2019-04-30 PROCEDURE — 85027 COMPLETE CBC AUTOMATED: CPT

## 2019-04-30 PROCEDURE — 2580000003 HC RX 258: Performed by: ORTHOPAEDIC SURGERY

## 2019-04-30 PROCEDURE — 2500000003 HC RX 250 WO HCPCS: Performed by: NURSE ANESTHETIST, CERTIFIED REGISTERED

## 2019-04-30 PROCEDURE — 7100000010 HC PHASE II RECOVERY - FIRST 15 MIN: Performed by: ORTHOPAEDIC SURGERY

## 2019-04-30 PROCEDURE — 7100000000 HC PACU RECOVERY - FIRST 15 MIN: Performed by: ORTHOPAEDIC SURGERY

## 2019-04-30 PROCEDURE — 6360000002 HC RX W HCPCS: Performed by: NURSE ANESTHETIST, CERTIFIED REGISTERED

## 2019-04-30 PROCEDURE — 2500000003 HC RX 250 WO HCPCS: Performed by: ORTHOPAEDIC SURGERY

## 2019-04-30 PROCEDURE — 2709999900 HC NON-CHARGEABLE SUPPLY: Performed by: ORTHOPAEDIC SURGERY

## 2019-04-30 DEVICE — IMPLANTABLE DEVICE
Type: IMPLANTABLE DEVICE | Site: SHOULDER | Status: FUNCTIONAL
Brand: BONE ANCHORS WITH ARTHROSCOPIC DELIVERY SYSTEM

## 2019-04-30 DEVICE — Z INACTIVE USE 2600835 ANCHOR TEND 8 FOR REGENETEN BIOINDUCTIVE IMPL SYS: Type: IMPLANTABLE DEVICE | Site: SHOULDER | Status: FUNCTIONAL

## 2019-04-30 DEVICE — MULTIFIX S-ULTRA 5.5MM KNOTLESS ANCHOR
Type: IMPLANTABLE DEVICE | Site: SHOULDER | Status: FUNCTIONAL
Brand: MULTIFIX

## 2019-04-30 DEVICE — IMPLANTABLE DEVICE
Type: IMPLANTABLE DEVICE | Site: SHOULDER | Status: FUNCTIONAL
Brand: ROTATION MEDICAL RECONSTITUTED COLLAGEN SCAFFOLD - ARTHROSCOPIC, MEDIUM

## 2019-04-30 DEVICE — HEALICOIL RG SA 4.75MM W/2 UB-BL                                    CBRD BL
Type: IMPLANTABLE DEVICE | Site: SHOULDER | Status: FUNCTIONAL
Brand: HEALICOIL

## 2019-04-30 RX ORDER — DEXAMETHASONE SODIUM PHOSPHATE 10 MG/ML
INJECTION, SOLUTION INTRAMUSCULAR; INTRAVENOUS PRN
Status: DISCONTINUED | OUTPATIENT
Start: 2019-04-30 | End: 2019-04-30 | Stop reason: SDUPTHER

## 2019-04-30 RX ORDER — HYDROCODONE BITARTRATE AND ACETAMINOPHEN 5; 325 MG/1; MG/1
1 TABLET ORAL
Status: DISCONTINUED | OUTPATIENT
Start: 2019-04-30 | End: 2019-04-30 | Stop reason: HOSPADM

## 2019-04-30 RX ORDER — ONDANSETRON 2 MG/ML
4 INJECTION INTRAMUSCULAR; INTRAVENOUS
Status: DISCONTINUED | OUTPATIENT
Start: 2019-04-30 | End: 2019-04-30 | Stop reason: HOSPADM

## 2019-04-30 RX ORDER — SUCCINYLCHOLINE CHLORIDE 20 MG/ML
INJECTION INTRAMUSCULAR; INTRAVENOUS PRN
Status: DISCONTINUED | OUTPATIENT
Start: 2019-04-30 | End: 2019-04-30 | Stop reason: SDUPTHER

## 2019-04-30 RX ORDER — HYDROMORPHONE HCL 110MG/55ML
0.25 PATIENT CONTROLLED ANALGESIA SYRINGE INTRAVENOUS EVERY 5 MIN PRN
Status: DISCONTINUED | OUTPATIENT
Start: 2019-04-30 | End: 2019-04-30 | Stop reason: HOSPADM

## 2019-04-30 RX ORDER — LIDOCAINE HYDROCHLORIDE 10 MG/ML
1 INJECTION, SOLUTION EPIDURAL; INFILTRATION; INTRACAUDAL; PERINEURAL
Status: DISCONTINUED | OUTPATIENT
Start: 2019-04-30 | End: 2019-04-30 | Stop reason: HOSPADM

## 2019-04-30 RX ORDER — SODIUM CHLORIDE 9 MG/ML
INJECTION, SOLUTION INTRAVENOUS CONTINUOUS
Status: DISCONTINUED | OUTPATIENT
Start: 2019-04-30 | End: 2019-04-30 | Stop reason: HOSPADM

## 2019-04-30 RX ORDER — HYDROMORPHONE HCL 110MG/55ML
0.5 PATIENT CONTROLLED ANALGESIA SYRINGE INTRAVENOUS EVERY 5 MIN PRN
Status: DISCONTINUED | OUTPATIENT
Start: 2019-04-30 | End: 2019-04-30 | Stop reason: HOSPADM

## 2019-04-30 RX ORDER — ONDANSETRON 2 MG/ML
INJECTION INTRAMUSCULAR; INTRAVENOUS PRN
Status: DISCONTINUED | OUTPATIENT
Start: 2019-04-30 | End: 2019-04-30 | Stop reason: SDUPTHER

## 2019-04-30 RX ORDER — EPHEDRINE SULFATE 50 MG/ML
INJECTION INTRAVENOUS PRN
Status: DISCONTINUED | OUTPATIENT
Start: 2019-04-30 | End: 2019-04-30 | Stop reason: SDUPTHER

## 2019-04-30 RX ORDER — SODIUM CHLORIDE 9 MG/ML
INJECTION, SOLUTION INTRAVENOUS CONTINUOUS PRN
Status: DISCONTINUED | OUTPATIENT
Start: 2019-04-30 | End: 2019-04-30 | Stop reason: SDUPTHER

## 2019-04-30 RX ORDER — FENTANYL CITRATE 50 UG/ML
50 INJECTION, SOLUTION INTRAMUSCULAR; INTRAVENOUS ONCE
Status: COMPLETED | OUTPATIENT
Start: 2019-04-30 | End: 2019-04-30

## 2019-04-30 RX ORDER — PROPOFOL 10 MG/ML
INJECTION, EMULSION INTRAVENOUS PRN
Status: DISCONTINUED | OUTPATIENT
Start: 2019-04-30 | End: 2019-04-30 | Stop reason: SDUPTHER

## 2019-04-30 RX ORDER — GLYCOPYRROLATE 0.2 MG/ML
INJECTION INTRAMUSCULAR; INTRAVENOUS PRN
Status: DISCONTINUED | OUTPATIENT
Start: 2019-04-30 | End: 2019-04-30 | Stop reason: SDUPTHER

## 2019-04-30 RX ORDER — MIDAZOLAM HYDROCHLORIDE 1 MG/ML
1 INJECTION INTRAMUSCULAR; INTRAVENOUS ONCE
Status: COMPLETED | OUTPATIENT
Start: 2019-04-30 | End: 2019-04-30

## 2019-04-30 RX ORDER — APREPITANT 40 MG/1
40 CAPSULE ORAL ONCE
Status: COMPLETED | OUTPATIENT
Start: 2019-04-30 | End: 2019-04-30

## 2019-04-30 RX ORDER — FENTANYL CITRATE 50 UG/ML
INJECTION, SOLUTION INTRAMUSCULAR; INTRAVENOUS PRN
Status: DISCONTINUED | OUTPATIENT
Start: 2019-04-30 | End: 2019-04-30 | Stop reason: SDUPTHER

## 2019-04-30 RX ORDER — LIDOCAINE HYDROCHLORIDE 20 MG/ML
INJECTION, SOLUTION INFILTRATION; PERINEURAL PRN
Status: DISCONTINUED | OUTPATIENT
Start: 2019-04-30 | End: 2019-04-30 | Stop reason: SDUPTHER

## 2019-04-30 RX ORDER — CEFAZOLIN SODIUM 2 G/100ML
2 INJECTION, SOLUTION INTRAVENOUS
Status: COMPLETED | OUTPATIENT
Start: 2019-04-30 | End: 2019-04-30

## 2019-04-30 RX ADMIN — SODIUM CHLORIDE: 9 INJECTION, SOLUTION INTRAVENOUS at 07:05

## 2019-04-30 RX ADMIN — MIDAZOLAM 2 MG: 1 INJECTION INTRAMUSCULAR; INTRAVENOUS at 07:10

## 2019-04-30 RX ADMIN — GLYCOPYRROLATE 0.2 MG: 0.2 INJECTION INTRAMUSCULAR; INTRAVENOUS at 08:04

## 2019-04-30 RX ADMIN — LIDOCAINE HYDROCHLORIDE 100 MG: 20 INJECTION, SOLUTION INFILTRATION; PERINEURAL at 07:52

## 2019-04-30 RX ADMIN — FAMOTIDINE 20 MG: 10 INJECTION, SOLUTION INTRAVENOUS at 07:05

## 2019-04-30 RX ADMIN — APREPITANT 40 MG: 40 CAPSULE ORAL at 07:00

## 2019-04-30 RX ADMIN — ONDANSETRON 4 MG: 2 INJECTION INTRAMUSCULAR; INTRAVENOUS at 08:08

## 2019-04-30 RX ADMIN — SODIUM CHLORIDE: 9 INJECTION, SOLUTION INTRAVENOUS at 08:41

## 2019-04-30 RX ADMIN — SUCCINYLCHOLINE CHLORIDE 140 MG: 20 INJECTION, SOLUTION INTRAMUSCULAR; INTRAVENOUS at 07:52

## 2019-04-30 RX ADMIN — FENTANYL CITRATE 50 MCG: 50 INJECTION INTRAMUSCULAR; INTRAVENOUS at 07:11

## 2019-04-30 RX ADMIN — EPHEDRINE SULFATE 10 MG: 50 INJECTION, SOLUTION INTRAVENOUS at 08:04

## 2019-04-30 RX ADMIN — CEFAZOLIN SODIUM 2 G: 2 INJECTION, SOLUTION INTRAVENOUS at 07:42

## 2019-04-30 RX ADMIN — FENTANYL CITRATE 100 MCG: 50 INJECTION, SOLUTION INTRAMUSCULAR; INTRAVENOUS at 07:52

## 2019-04-30 RX ADMIN — SODIUM CHLORIDE: 9 INJECTION, SOLUTION INTRAVENOUS at 07:40

## 2019-04-30 RX ADMIN — DEXAMETHASONE SODIUM PHOSPHATE 10 MG: 10 INJECTION, SOLUTION INTRAMUSCULAR; INTRAVENOUS at 08:08

## 2019-04-30 RX ADMIN — PROPOFOL 150 MG: 10 INJECTION, EMULSION INTRAVENOUS at 07:52

## 2019-04-30 ASSESSMENT — PULMONARY FUNCTION TESTS
PIF_VALUE: 2
PIF_VALUE: 2
PIF_VALUE: 20
PIF_VALUE: 22
PIF_VALUE: 19
PIF_VALUE: 20
PIF_VALUE: 20
PIF_VALUE: 7
PIF_VALUE: 51
PIF_VALUE: 20
PIF_VALUE: 22
PIF_VALUE: 17
PIF_VALUE: 1
PIF_VALUE: 31
PIF_VALUE: 20
PIF_VALUE: 22
PIF_VALUE: 20
PIF_VALUE: 2
PIF_VALUE: 18
PIF_VALUE: 7
PIF_VALUE: 2
PIF_VALUE: 20
PIF_VALUE: 19
PIF_VALUE: 21
PIF_VALUE: 19
PIF_VALUE: 19
PIF_VALUE: 20
PIF_VALUE: 5
PIF_VALUE: 20
PIF_VALUE: 17
PIF_VALUE: 22
PIF_VALUE: 20
PIF_VALUE: 17
PIF_VALUE: 13
PIF_VALUE: 20
PIF_VALUE: 1
PIF_VALUE: 22
PIF_VALUE: 29
PIF_VALUE: 20
PIF_VALUE: 21
PIF_VALUE: 20
PIF_VALUE: 20
PIF_VALUE: 22
PIF_VALUE: 20
PIF_VALUE: 3
PIF_VALUE: 22
PIF_VALUE: 20
PIF_VALUE: 20
PIF_VALUE: 21
PIF_VALUE: 20
PIF_VALUE: 20
PIF_VALUE: 31
PIF_VALUE: 20
PIF_VALUE: 22
PIF_VALUE: 31
PIF_VALUE: 19
PIF_VALUE: 22
PIF_VALUE: 20
PIF_VALUE: 17
PIF_VALUE: 22
PIF_VALUE: 20
PIF_VALUE: 18
PIF_VALUE: 19
PIF_VALUE: 20
PIF_VALUE: 1
PIF_VALUE: 20
PIF_VALUE: 18
PIF_VALUE: 0
PIF_VALUE: 35
PIF_VALUE: 22
PIF_VALUE: 21
PIF_VALUE: 20
PIF_VALUE: 20
PIF_VALUE: 21
PIF_VALUE: 3
PIF_VALUE: 20
PIF_VALUE: 20
PIF_VALUE: 2
PIF_VALUE: 21
PIF_VALUE: 20
PIF_VALUE: 21
PIF_VALUE: 1
PIF_VALUE: 19
PIF_VALUE: 21
PIF_VALUE: 17
PIF_VALUE: 1
PIF_VALUE: 8
PIF_VALUE: 22
PIF_VALUE: 17
PIF_VALUE: 20
PIF_VALUE: 9
PIF_VALUE: 22
PIF_VALUE: 2
PIF_VALUE: 19
PIF_VALUE: 9
PIF_VALUE: 17
PIF_VALUE: 8
PIF_VALUE: 20
PIF_VALUE: 29
PIF_VALUE: 19
PIF_VALUE: 20
PIF_VALUE: 22
PIF_VALUE: 22
PIF_VALUE: 20
PIF_VALUE: 22
PIF_VALUE: 17
PIF_VALUE: 20
PIF_VALUE: 20
PIF_VALUE: 17
PIF_VALUE: 20
PIF_VALUE: 22
PIF_VALUE: 20
PIF_VALUE: 22
PIF_VALUE: 0
PIF_VALUE: 1
PIF_VALUE: 20
PIF_VALUE: 19
PIF_VALUE: 22
PIF_VALUE: 21
PIF_VALUE: 20
PIF_VALUE: 20
PIF_VALUE: 21
PIF_VALUE: 7
PIF_VALUE: 17
PIF_VALUE: 20
PIF_VALUE: 22
PIF_VALUE: 20
PIF_VALUE: 22
PIF_VALUE: 20
PIF_VALUE: 22
PIF_VALUE: 1
PIF_VALUE: 20
PIF_VALUE: 19
PIF_VALUE: 4
PIF_VALUE: 22
PIF_VALUE: 28

## 2019-04-30 ASSESSMENT — PAIN DESCRIPTION - DESCRIPTORS: DESCRIPTORS: ACHING;CONSTANT

## 2019-04-30 ASSESSMENT — PAIN - FUNCTIONAL ASSESSMENT: PAIN_FUNCTIONAL_ASSESSMENT: 0-10

## 2019-04-30 ASSESSMENT — PAIN SCALES - GENERAL: PAINLEVEL_OUTOF10: 3

## 2019-04-30 NOTE — ANESTHESIA PRE PROCEDURE
tablet  1 tablet Oral Once PRN Lars Davenport MD        ondansetron [facility]) injection 4 mg  4 mg Intravenous Once PRN Lars Davenport MD        HYDROmorphone (DILAUDID) injection 0.25 mg  0.25 mg Intravenous Q5 Min PRN Lars Davenport MD        HYDROmorphone (DILAUDID) injection 0.5 mg  0.5 mg Intravenous Q5 Min PRN Lars Davenport MD           Allergies: Allergies   Allergen Reactions    Amoxicillin Nausea Only    Codeine Nausea Only       Problem List:    Patient Active Problem List   Diagnosis Code    Type 2 diabetes mellitus ([facility] [address] E11.9    Mixed hyperlipidemia E78.2    Osteopenia M85.80    Insomnia G47.00    Vitamin D deficiency E55.9    Arthritis, degenerative M19.90    Gastroesophageal reflux disease without esophagitis K21.9    Cervical osteoarthritis M47.812    Thrombocytopenia (HCC) D69.6    Complete tear of left rotator cuff M75.122    Superior glenoid labrum lesion of left shoulder S43.432A       Past Medical History:        Diagnosis Date    Anxiety and depression 4/10/2018    zoloft 1/2017- fall 2018    Cervical spondylosis     MRI 2014, marked to severe degenerative disease    Gastroesophageal reflux disease without esophagitis 11/22/2016    Longstanding prilosec use.  EGD approx 2005    H/O adenomatous polyp of colon 11/2015    rectal and sigmoid    Insomnia 11/22/2016    Mixed hyperlipidemia 11/22/2016    Osteopenia 11/22/2016    PONV (postoperative nausea and vomiting)     Type 2 diabetes mellitus ([facility] [address] 11/22/2016       Past Surgical History:        Procedure Laterality Date    BREAST REDUCTION SURGERY  appro 1996    BUNIONECTOMY Bilateral     left x 2    EYE SURGERY Bilateral     cateracts    LASIK      ROTATOR CUFF REPAIR Left 06/2018    Dr. Niecy Salcedo    TUBAL LIGATION         Social History:    Social History     Tobacco Use    Smoking status: Former Smoker     Packs/day: 0.50     Years: 3.00     Pack years: 1.50     Types: Cigarettes No results found for: PHART, PO2ART, JOU0HSI, QBE8KYL, BEART, X4HNAVOW     Type & Screen (If Applicable):  No results found for: LABABO, [address]    Anesthesia Evaluation  Patient summary reviewed   history of anesthetic complications: PONV. Airway: Mallampati: II  TM distance: >3 FB   Neck ROM: full  Mouth opening: > = 3 FB Dental: normal exam         Pulmonary: breath sounds clear to auscultation                            ROS comment: Former smoker   Cardiovascular:  Exercise tolerance: good (>4 METS),       (-) CABG/stent, dysrhythmias and  angina      Rhythm: regular  Rate: normal                    Neuro/Psych:      (-) seizures, TIA and CVA           GI/Hepatic/Renal:   (+) GERD: well controlled,           Endo/Other:    (+) Diabetes, : arthritis:., .                  ROS comment: thrombocytopenia Abdominal:           Vascular:                                        Anesthesia Plan      general and regional     ASA 2     (Risks of regional anesthesia discussed include infection, hematoma, intravascular injection, temporary or permanent nerve damage, partial or failed block. Patient verbalizes understanding and her wishes to proceed with planned anesthetic.)  Induction: intravenous. MIPS: Postoperative opioids intended, Prophylactic antiemetics administered and Postoperative trial extubation. Anesthetic plan and risks discussed with patient. Plan discussed with CRNA.                   Feli Tripathi MD   4/30/2019

## 2019-04-30 NOTE — ANESTHESIA POSTPROCEDURE EVALUATION
Department of Anesthesiology  Postprocedure Note    Patient: Angeles Banks  MRN: [id number]  YOB: 1950  Date of evaluation: 4/30/2019  Time:  3:49 PM     Procedure Summary     Date:  04/30/19 Room / Location:  [facility] OR 03 / [facility] OR    Anesthesia Start:  0745 Anesthesia Stop:  6859    Procedure:  RIGHT SHOULDER ARTHROSCOPE, SUBACROMIAL DECOMPRESSION, DISTAL CLAVICLE EXCISION, DEBRIDEMENT, ROTATOR CUFF REPAIR, ROTATOR CUFF AUGMENTATION, LONG HEAD OF BICEPS TENODESIS (Right Shoulder) Diagnosis:       (M75.41 RIGHT SHOULDER IMPINGEMENT)      (M19.211 DISTAL CLAVICLE ARTHRITIS)      (M75.101 ROTATOR CUFF TEAR)    Surgeon:  Madonna Levy DO Responsible Provider:  Thiago Hedrick MD    Anesthesia Type:  general, regional ASA Status:  2          Anesthesia Type: general, regional    Silvio Phase I: Silvio Score: 9    Silvio Phase II: Silvio Score: 10    Last vitals: Reviewed and per EMR flowsheets.        Anesthesia Post Evaluation    Patient location during evaluation: PACU  Patient participation: complete - patient participated  Level of consciousness: awake  Airway patency: patent  Nausea & Vomiting: no vomiting  Complications: no  Cardiovascular status: hemodynamically stable  Respiratory status: acceptable  Hydration status: euvolemic

## 2019-04-30 NOTE — PROGRESS NOTES
CLINICAL PHARMACY NOTE: MEDS TO [address] Patient?: No  Total # of Prescriptions Filled: 1   The following medications were delivered to the patient:  · Oxycodone 10mg  Total # of Interventions Completed: 0  Time Spent (min): 30    Additional Documentation:  Medication delivered-  signed.   [employer] [facility]

## 2019-04-30 NOTE — PROGRESS NOTES
Changed pulse oximeter to adhesive on forehead due to weak pleths from finger oximeter and gel nail polish on, oxygen saturations high 90\"s pleths strong. Patient denies any sob or distress,respirations easy and regular.

## 2019-04-30 NOTE — OP NOTE
[facility]       [address] [facility], [address] [facility]       Operative note by  Chris Cool. Medina Phillips MS, DO  Orthopedic surgeon  Orth[employer]ics [employer] trained  Board-certified  Team Physician for [facility]                       Shoulder Arthroscopy      Patient Name:  Deondre Montoya    YOB: 1950    Medical  Record number: [id number]    Account number:  [de-identified]    Date Of Surgery: 4/30/2019    Date Of Dictation: 4/30/2019    Location: [address]    Surgeon: Dr. Kellen Julian    Assistant: None    Anesthesia: Local with General    Indications:  Chronic pain not alleviated by conservative therapy, positive MRI, not improved with conservative care    Complications: None    Estimated blood loss: Minimal    Preoperative antibiotics: Given and documented in the chart        Preoperative diagnosis :  1. Right shoulder subacromial impingement  2. Right shoulder distal clavicle arthritis  3. Right shoulder rotator cuff tear            Postoperative diagnosis :  1. Right Shoulder subacromial impingement  2. Right shoulder type I labral tear with synovitis  3. Right shoulder full-thickness rotator cuff tear  4. Right shoulder distal clavicle arthritis  5. Right shoulder near full-thickness long head of the biceps tear      Procedure performed:  1. Right shoulder diagnostic arthroscopy  2. Right shoulder arthroscopic labral debridement, synovectomy, undersurface rotator cuff debridement in the glenohumeral joint  3. Right shoulder arthroscopic rotator cuff repair using 2 row fixation and one ultra braid's side to side reapproximation of a L-shaped tear  4. Right shoulder arthroscopic subacromial decompression  5. Right shoulder arthroscopic distal clavicle excision  6. Right shoulder arthroscopic rotator cuff augmentation  7.   Right shoulder long head of the biceps tenodesed                Procedure in detail:  Patient was seen and evaluated A history and physical was obtained a written consent was discussed and signed by the patient. Following the anesthesia evaluation and discussion with the patient about the scheduled procedure and recovery along with postoperative follow-up plans the patient was brought back to the operative suite put on the operative table in the supine position. Following the patient's general anesthesia. The patient was sat up in the beachchair positioner. A pillow was put under the knees and taped in place the flank pads were tightened and a nonoperative arm pillow was applied. The neck and head positioner was carefully adjusted and tightened for optimal safety. At this point the operative arm was scrubbed with alcohol and Betadine and injected with 30 mL of Marcaine and 30 mL of lidocaine lidocaine did have epinephrine. I now performed a full physical exam of the shoulder under anesthesia for range of motion internal rotation, external rotation, forward flexion, I also tested anterior and posterior glide I tested for sulcus sign bilaterally and I also tested for instability. Following this the entire upper extremity was scrubbed with DuraPrep and draped in a sterile manner. The arm was hooked up to [facility] and with gentle traction a posterior portal was placed using a sharp scalpel and a blunt trocar entering the joint without any difficulty. With careful visualization of the entire intra-articular joint a anterior portal was made with an 18-gauge spinal needle sharp scalpel and a blunt trocar. The probe was used to probe all of the intra-articular pathology including the labrum, the long head of the biceps, the rotator cuff, the glenohumeral joint, the inferior recess, and the chondral surface.          After the initial diagnostic arthroscopy I went ahead and used the shaver and the bipolar through the anterior portal to remove hypertrophic synovitis, labral fraying, and undersurface rotator cuff fraying. The synovitis was removed from the inferior recess posterior to the labrum superior to the labrum and anterior to the labrum. The labral tissue was smoothed off with a shaver and a bipolar both posterior superiorly and anteriorly. The undersurface rotator cuff fraying was removed 1st with a shaver then the edges were smoothed off with the bipolar. Any chondral surface fraying was removed either with the shaver, bipolar or probe. Following my standard diagnostic arthroscopy the cannula was removed from the glenohumeral joint. The blunt trocar was inserted into the cannula and through the posterior portal the cannula was entered into the subacromial space without any difficulty. Now under direct visualization we could see there was moderate bursitis, synovitis, a subacromial spur and distal clavicle spur. Under direct visualization I put a 18-gauge spinal needle laterally through the deltoid into the subacromial space, liking this location I made my portal with a sharp scalpel and a blunt trocar. At this point I entered the subacromial space with a shaver and I removed all the bursa tissue synovial tissue and tissue lining the subacromial joint space and also the bone spur and around the distal clavicle. I used the bipolar to remove all soft tissue from the undersurface bone spur and the distal clavicle spur. I removed the bipolar tissue ablator and entered with the 5.5 mm  barrel bur and proceeded to perform a generous subacromial decompression through the lateral portal and through the posterior portal.      Next I went ahead and addressed the distal clavicle with a shaver and a bipolar through the lateral portal and then resecting 5 mm to the level of the subacromial decompression.   I also used the shaver the bipolar and the 5.5 mm barrel bur to undermine the distal clavicle to make sure there was no impingement on the rotator cuff tissue. I now removed the 5.5 mm barrel bur from the lateral portal and I made an anterior portal with a blunt trocar and entered with a shaver then a bipolar and I finished off the distal clavicle to the capsule with a 5.5 mm barrel bur. The subacromial space was visualized through the anterior portal lateral portal and the posterior portal into bleeding tissue was cauterized any loose debris was removed with the shaver once all this was performed I removed all instruments from the subacromial space. Following the standard diagnostic arthroscopy and debridement of frayed labrum, undersurface rotator cuff fraying, synovium and any loose debris the long head of the biceps was evaluated and it was significantly frayed and partially torn. Following this the long head of the biceps tenodesis was started by inserting a 70° up nylon suture passer through the lateral deltoid through the rotator cuff through the proximal portion of the long head of the biceps and deployed this was pulled out through the anterior portal with a ice tong and a ultra tape was inserted through the loop and pulled back through the rotator cuff long head of the biceps and deltoid musculature. The suture passer was inserted through a 2nd time, through the deltoid musculature the rotator cuff and the long head of the biceps and again pulled out through the anterior portal with the ice tong and the other limb of the ultra braid was pulled through the long head of the biceps the rotator cuff tendon and the deltoid musculature. This was done a 2nd time with a 2nd ultra tape both being approximately 1 cm apart pulling the long head of the biceps tight to the rotator cuff as it entered the long head of the biceps groove. Once both of these were secured I went ahead and released the long head of the biceps at its attachment using the bipolar wand. then removing the screwdriver showing the 4 sutures coming out of the anchor. I also put a side-to-side ultra braid tape through the rotator cuff L tear tied this taut with a Prajapati loop followed by 3  1/2 hiches. Each suture was put through the rotator cuff in a fan shaped manner from anterior to posterior using a Elite Pass suture passer at 16 mm depth. Once each suture was passed through the rotator cuff it was pulled out through the anterior portal with a grasper. Both ends of each suture were pulled out through the lateral portal with a ice tong and tied with a Prajapati loop and 3 half hitches. Once all of the medial row sutures were tied with started to prepare for the lateral row fixation of the 2 row rotator cuff repair technique. The lateral row was inserted using 4.5 mm lateral row anchors tapped in place the sutures were pulled taut and the locking mechanism was cranked until they were caught in the anchor. Once these 2 anchors were inserted we had an M shaped sonu cross pattern to a rotator cuff repair which was probed showed that it was a solid fixation with good pressure against the lateral humeral head. Following this I went ahead and cut these sutures short at the anchor site. The joint was now copiously irrigated any bleeding tissue was cauterized both cannulas were removed all instruments were removed. The patient was put in a shoulder immobilizer with a pillow bolster. Rotator cuff instructions were given to the patient in the postoperative area. Following the preparation of the subacromial space a rotator cuff augmentation was performed. Through a separate lateral portal made with a sharp scalpel and a blunt trocar I inserted the medium sized Regeneten augmentation graft.   Once the graft was deployed and maneuvered into place a separate portal was made with a spinal needle sharp scalpel blunt trocar and a separate cannula to insert the soft tissue staples starting in each corner then the sides and then the center. Following the fixation of the graft I went ahead and removed the  and used 1 bone staples to secure the inferior border of the augmentation graft. At this point all instruments were removed from the shoulder each portal was closed with a simple interrupted suture. Each portal was covered with a sterile Band-Aid sterile gauze and ABD then tape. The patient was brought to recovery in stable condition. The Patient was given typewritten instructions for postoperative care. The patient was given exercises, pain medication, anti-inflammatory medication, a follow-up appointment in the office in 1 week. The patient was given instructions and a number to call if there is any concerns or problems. The patient will be discharged to home from the postoperative area when in stable condition and understands the instructions. _____________________         Sienna Cohn MS, DO         Orthopedic Surgeon          [employer]ics Sports [employer] trained         Board-certified         Team Physician for [employer]

## 2019-04-30 NOTE — ANESTHESIA PROCEDURE NOTES
Peripheral Block    Patient location during procedure: pre-op  Start time: 4/30/2019 7:15 AM  End time: 4/30/2019 7:23 AM  Staffing  Anesthesiologist: Bo Zarate MD  Performed: anesthesiologist   Preanesthetic Checklist  Completed: patient identified, site marked, surgical consent, pre-op evaluation, timeout performed, IV checked, risks and benefits discussed, monitors and equipment checked, anesthesia consent given, oxygen available and patient being monitored  Peripheral Block  Patient position: sitting  Prep: ChloraPrep  Patient monitoring: cardiac monitor, continuous pulse ox, frequent blood pressure checks and IV access  Block type: Brachial plexus  Laterality: right  Injection technique: single-shot  Procedures: ultrasound guided and nerve stimulator  Local infiltration: ropivacaine  Infiltration strength: 0.5 %  Dose: 25 mL  Interscalene  Provider prep: mask and sterile gloves  Local infiltration: ropivacaine  Needle  Needle type: Other   Needle gauge: 20 G  Needle length: 5 cm  Needle localization: nerve stimulator and ultrasound guidanceOther needle type: insulated needle, non cutting tip  Assessment  Injection assessment: negative aspiration for heme, no paresthesia on injection and local visualized surrounding nerve on ultrasound  Paresthesia pain: none  Slow fractionated injection: yes  Hemodynamics: stable  Additional Notes  Patient tolerated procedure well. No immediate complications.   Reason for block: post-op pain management and at surgeon's request

## 2019-05-01 DIAGNOSIS — M75.121 COMPLETE TEAR OF RIGHT ROTATOR CUFF: Primary | ICD-10-CM

## 2019-05-06 ENCOUNTER — OFFICE VISIT (OUTPATIENT)
Dept: ORTHOPEDIC SURGERY | Age: 69
End: 2019-05-06

## 2019-05-06 VITALS — BODY MASS INDEX: 26.99 KG/M2 | HEIGHT: 64 IN | WEIGHT: 158.07 LBS

## 2019-05-06 DIAGNOSIS — M75.121 COMPLETE TEAR OF RIGHT ROTATOR CUFF: Primary | ICD-10-CM

## 2019-05-06 PROCEDURE — 99024 POSTOP FOLLOW-UP VISIT: CPT | Performed by: ORTHOPAEDIC SURGERY

## 2019-05-07 ENCOUNTER — HOSPITAL ENCOUNTER (OUTPATIENT)
Dept: PHYSICAL THERAPY | Age: 69
Setting detail: THERAPIES SERIES
Discharge: HOME | End: 2019-05-07
Payer: MEDICARE

## 2019-05-07 PROCEDURE — 97161 PT EVAL LOW COMPLEX 20 MIN: CPT | Performed by: PHYSICAL THERAPIST

## 2019-05-07 PROCEDURE — 97110 THERAPEUTIC EXERCISES: CPT | Performed by: PHYSICAL THERAPIST

## 2019-05-07 NOTE — PLAN OF CARE
The [address] [place] [address]  Phone [phone]  Fax [phone]      Physical Therapy Certification    Dear Referring Practitioner: Shaheen Vasquez DO,    We had the pleasure of evaluating the following patient for physical therapy services at [address] [place] [address]. A summary of our findings can be found in the initial assessment below. This includes our plan of care. If you have any questions or concerns regarding these findings, please do not hesitate to contact me at the office phone number checked above. Thank you for the referral.       Physician Signature:_______________________________Date:__________________  By signing above (or electronic signature), therapists plan is approved by physician      Patient: Vijay Umaña   : 1950   MRN: [id number]  Referring Physician: Referring Practitioner: Shaheen Vasquez DO      Evaluation Date: 2019      Medical Diagnosis Information:  Diagnosis: M75.121 (ICD-10-CM) - Complete tear of right rotator cuff   Treatment Diagnosis: m25.511 m25. 611                                         Insurance information: PT Insurance Information: anthem/[id number]$copay    Precautions/ Contra-indications: RCR/bicep tenodesis protocol  Latex Allergy:  ?NO      ? YES  Preferred Language for Healthcare:   ?English       ? other:    SUBJECTIVE: Patient stated complaint: SANA:wear and tear, increased over a year. 2018 had L RCR which has done well. Relevant Medical History: diab 2  Functional Disability Index:     Pain Scale: 5-8/10  Easing factors: rest, ice, aleve, tylenol   Provocative factors: movement     Type: X? Constant   ? Intermittent  ? Radiating ? Localized ? other:     Numbness/Tingling: none    Occupation/School: ,     Living Status/Prior Level of Function: Independent with ADLs and IADLs, walking, light strength training    OBJECTIVE:     ROM 5/7 PROM AROM Comment    L R L R    Flexion  ~80with bent over      Abduction        ER        IR        Other(cervical)        Other             Strength not tested due to recent surgery 5/7 L R Comment   Flexion      Abduction      ER      IR      Supraspinatus      Upper Trap      Lower Trap      Mid Trap      Rhomboids      Biceps      Triceps      Horizontal Abduction      Horizontal Adduction      Lats        Special Tests Results/Comment   Gema    Neluciano    Speeds    OBriens    Other    Neurologic Signs    Functional Reach      Reflexes/Sensation:    ? Dermatomes/Myotomes intact       ? Other:    Joint mobility: not assessed due to recent surgery 5/7   ? Normal    X? Hypo   ? Hyper    Palpation: general TTP due to recent surgery 5/7    Functional Mobility/Transfers: I with protective posture R UE/ultrasling 5/7    Posture: WFL for 76year old female, ultra sling in place 5/7    Bandages/Dressings/Incisions: portals closed, no signs of infection 5/7    Gait: (include devices/WB status): WNL, R UE ultrasling 5/7                         ? Patient history, allergies, meds reviewed. Medical chart reviewed. See intake form. 5/7    Review Of Systems (ROS):  ?Performed Review of systems (Integumentary, CardioPulmonary, Neurological) by intake and observation. Intake form has been scanned into medical record. Patient has been instructed to contact their primary care physician regarding ROS issues if not already being addressed at this time. 5/7    Co-morbidities/Complexities (which will affect course of rehabilitation):   ? None           Arthritic conditions   ? Rheumatoid arthritis (M05.9)  ? Osteoarthritis (M19.91)   Cardiovascular conditions   ? Hypertension (I10)  ? Hyperlipidemia (E78.5)  ? Angina pectoris (I20)  ? Atherosclerosis (I70)   Musculoskeletal conditions   ? Disc pathology   ? Congenital spine pathologies   ? Prior surgical intervention  ? Osteoporosis (M81.8)  ? Osteopenia (M85.8)   Endocrine conditions   ? Hypothyroid (E03. 9)  ? Hyperthyroid Gastrointestinal conditions   ? Constipation (Z35.30)   Metabolic conditions   ? Morbid obesity (E66.01)  XX? Diabetes type 1(E10.65) or 2 (E11.65)   ? Neuropathy (G60.9)     Pulmonary conditions   ? Asthma (J45)  ? Coughing   ? COPD (J44.9)   Psychological Disorders  ? Anxiety (F41.9)  ? Depression (F32.9)   ? Other:   ?Other:          Barriers to/and or personal factors that will affect rehab potential:              X?Age  X? Sex             X ?Motivation/Lack of Motivation                        X?Co-Morbidities              ? Cognitive Function, education/learning barriers              ? Environmental, home barriers              X?profession/work barriers  X? past PT/medical experience  ? other:  Justification: above noted could affect rehab potential    Falls Risk Assessment (30 days):   ? Falls Risk assessed and no intervention required. ? Falls Risk assessed and Patient requires intervention due to being higher risk   TUG score (>12s at risk):     ? Falls education provided, including       G-Codes:   100% disability with UEFS    ASSESSMENT:   Functional Impairments   X? Noted spinal or UE joint hypomobility   ? Noted spinal or UE joint hypermobility   X? Decreased UE functional ROM   X? Decreased UE functional strength   ? Abnormal reflexes/sensation/myotomal/dermatomal deficits   X? Decreased RC/scapular/core strength and neuromuscular control   ?other:      Functional Activity Limitations (from functional questionnaire and intake)   X? Reduced ability to tolerate prolonged functional positions   X? Reduced ability or difficulty with changes of positions or transfers between positions   X? Reduced ability to maintain good posture and demonstrate good body mechanics with sitting, bending, and lifting   X? Reduced ability or tolerance with driving and/or computer work   X? Reduced ability to sleep   X? Reduced ability to perform lifting, reaching, carrying tasks   X? Reduced ability to tolerate impact through stable and/or uncomplicated characteristics   ? evolving clinical presentation with changing characteristics  ? unstable and unpredictable characteristics;   ? Clinical decision making of ? low, ? moderate, ? high complexity using standardized patient assessment instrument and/or measurable assessment of functional outcome. X? EVAL (LOW) 41111 (typically 20 minutes face-to-face)  ? EVAL (MOD) 60004 (typically 30 minutes face-to-face)  ? EVAL (HIGH) 07627 (typically 45 minutes face-to-face)  ? RE-EVAL       PLAN:  Frequency/Duration:  1-2 days per week for 4 Weeks:5/7-6/7/2019  INTERVENTIONS:  X? Therapeutic exercise including: strength training, ROM, for Upper extremity and core   X? NMR activation and proprioception for UE, scap and Core   X? Manual therapy as indicated for shoulder, scapula and spine to include: Dry Needling/IASTM, STM, PROM, Gr I-IV mobilizations, manipulation. X? Modalities as needed that may include: thermal agents, E-stim, Biofeedback, US, iontophoresis as indicated  X? Patient education on joint protection, postural re-education, activity modification, progression of HEP. HEP instruction: (see scanned forms)    GOALS:  Patient stated goal: return toPLOF    Therapist goals for Patient:   Short Term Goals: To be achieved in: 2-4 weeks  1. Independent in HEP and progression per patient tolerance, in order to prevent re-injury. 2. Patient will have a decrease in pain to facilitate improvement in movement, function, and ADLs as indicated by Functional Deficits. Long Term Goals: To be achieved in: 16-24 weeks  1. Disability index score of 10% or less for the UEFS to assist with reaching prior level of function. 2. Patient will demonstrate increased AROM to *WFLto allow for proper joint functioning as indicated by patients Functional Deficits.    3. Patient will demonstrate an increase in Strength to good scapular and core control  for UE to allow for proper functional mobility as

## 2019-05-07 NOTE — FLOWSHEET NOTE
The [address] [place] [address],[address], [address], [address] [place] [address]  Phone: [phone]   Fax:     [phone]    Physical Therapy Daily Treatment Note  Date:  2019    Patient Name:  Armen Ruelas    :  1950  MRN: [id number]  Restrictions/Precautions:    Medical/Treatment Diagnosis Information:  Diagnosis: M75.121 (ICD-10-CM) - Complete tear of right rotator cuff  Treatment Diagnosis: m25.511 m25.611   Procedure performed: 2019  1. Right shoulder diagnostic arthroscopy  2. Right shoulder arthroscopic labral debridement, synovectomy, undersurface rotator cuff debridement in the glenohumeral joint  3. Right shoulder arthroscopic rotator cuff repair using 2 row fixation and one ultra braid's side to side reapproximation of a L-shaped tear  4. Right shoulder arthroscopic subacromial decompression  5. Right shoulder arthroscopic distal clavicle excision  6. Right shoulder arthroscopic rotator cuff augmentation  7.   Right shoulder long head of the biceps tenodesed          Insurance/Certification information:  PT Insurance Information: anthem/40$copay  Physician Information:  Referring Practitioner: Tay Christie DO  Plan of care signed (Y/N):     Date of Patient follow up with Physician:     Functional disability 100% UEFS         Progress Note: []  Yes  []  No  Next due by: Visit #10 or wekk of       Latex Allergy:  [x]NO      []YES  Preferred Language for Healthcare:   [x]English       []other:    Visit # Insurance Allowable   1        eval unl  40$copay     Pain level:  -8/10 5/7     SUBJECTIVE:   See eval    OBJECTIVE:   ROM  PROM AROM  Comment     L R L R     Flexion   ~80with bent over         Abduction             ER             IR             Other(cervical)             Other                    Strength not tested due to recent surgery  L R Comment   Flexion         Abduction         ER         IR         Supraspinatus         Upper Trap         Lower Trap         Mid Trap         Rhomboids         Biceps         Triceps         Horizontal Abduction         Horizontal Adduction         Lats            Special Tests Results/Comment   Neurologic Signs     Functional Reach        Reflexes/Sensation:               ? Dermatomes/Myotomes intact                             ? Other:     Joint mobility: not assessed due to recent surgery 5/7              ? Normal                       X?Hypo              ? Hyper     Palpation: general TTP due to recent surgery 5/7     Functional Mobility/Transfers: I with protective posture R UE/ultrasling 5/7     Posture: WFL for 76year old female, ultra sling in place 5/7     Bandages/Dressings/Incisions: portals closed, no signs of infection 5/7     Gait: (include devices/WB status): WNL, R UE ultrasling 5/7                          ? Patient history, allergies, meds reviewed. Medical chart reviewed. See intake form. 5/7     Review Of Systems (ROS):  ?Performed Review of systems (Integumentary, CardioPulmonary, Neurological) by intake and observation. Intake form has been scanned into medical record. Patient has been instructed to contact their primary care physician regarding ROS issues if not already being addressed at this time.   5/7            RESTRICTIONS/PRECAUTIONS: RCR/bicep tenodesis protocol    Exercises/Interventions:   Exercises:  Exercise/Equipment Resistance/Repetitions Other comments   Stretching/PROM     Wand     Table Slides [id number]    UE [place]     Pulleys     Pendulum Dump x10 very small movements    Elbow PROM     Isometrics     Retraction 3x10     3x10    Weight shift     Flexion     Abduction     External Rotation     Internal Rotation     Biceps     Triceps          PRE's     Flexion     Abduction     External Rotation     Internal Rotation     Shrugs 3x10    EXT     Reverse Flys     Serratus     Horizontal Abd with ER     Biceps     Triceps     Retraction Cable Column/Theraband     External Rotation     Internal Rotation     Shrugs     Lats     Ext     Flex     Scapular Retraction     BIC     TRIC     PNF          Dynamic Stability          Plyoback          Manual interventions                     Therapeutic Exercise and NMR EXR  [x] (91087) Provided verbal/tactile cueing for activities related to strengthening, flexibility, endurance, ROM  for improvements in scapular, scapulothoracic and UE control with self care, reaching, carrying, lifting, house/yardwork, driving/computer work.    [] (59032) Provided verbal/tactile cueing for activities related to improving balance, coordination, kinesthetic sense, posture, motor skill, proprioception  to assist with  scapular, scapulothoracic and UE control with self care, reaching, carrying, lifting, house/yardwork, driving/computer work. Therapeutic Activities:    [] (36145 or 79719) Provided verbal/tactile cueing for activities related to improving balance, coordination, kinesthetic sense, posture, motor skill, proprioception and motor activation to allow for proper function of scapular, scapulothoracic and UE control with self care, carrying, lifting, driving/computer work.      Home Exercise Program:    [x] (25802) Reviewed/Progressed HEP activities related to strengthening, flexibility, endurance, ROM of scapular, scapulothoracic and UE control with self care, reaching, carrying, lifting, house/yardwork, driving/computer work  [] (82290) Reviewed/Progressed HEP activities related to improving balance, coordination, kinesthetic sense, posture, motor skill, proprioception of scapular, scapulothoracic and UE control with self care, reaching, carrying, lifting, house/yardwork, driving/computer work      Manual Treatments:  PROM / STM / Oscillations-Mobs:  G-I, II, III, IV (PA's, Inf., Post.)  [] (40145) Provided manual therapy to mobilize soft tissue/joints of cervical/CT, scapular GHJ and UE for the purpose of listed. [] Progression has been slowed due to co-morbidities. [x] Plan just implemented, too soon to assess goals progression  [] Other:     ASSESSMENT:  See eval    Treatment/Activity Tolerance:  [x] Patient tolerated treatment fair to well due to recent surgery 5/7 [] Patient limited by fatique  [] Patient limited by pain  [] Patient limited by other medical complications  [] Other:     Patient education:5/7 Reviewed diagnosis, POC, HEP and its importance. precautions and restrictions reviewed for RCR and bicep tenodesis    Prognosis: [x] Good [] Fair  [] Poor    Patient Requires Follow-up: [x] Yes  [] No    PLAN: 1-2 days per week for 4 Weeks:5/7-6/7/2019      [] Continue per plan of care [] Alter current plan (see comments)  [x] Plan of care initiated [] Hold pending MD visit [] Discharge    Electronically signed by: Manuel Cervantes PT,   *If patient does not return for further follow ups after this date. Please consider this as the patients discharge from physical therapy.

## 2019-05-10 ENCOUNTER — HOSPITAL ENCOUNTER (OUTPATIENT)
Dept: PHYSICAL THERAPY | Age: 69
Setting detail: THERAPIES SERIES
Discharge: HOME | End: 2019-05-10
Payer: MEDICARE

## 2019-05-10 PROCEDURE — 97110 THERAPEUTIC EXERCISES: CPT | Performed by: PHYSICAL THERAPIST

## 2019-05-10 PROCEDURE — 97140 MANUAL THERAPY 1/> REGIONS: CPT | Performed by: PHYSICAL THERAPIST

## 2019-05-10 NOTE — FLOWSHEET NOTE
[facility], [address] [place] [address], [address] [place] [address]  Phone: [phone]   Fax:     [phone]    Physical Therapy Daily Treatment Note  Date:  5/10/2019    Patient Name:  Vijay Umaña    :  1950  MRN: [id number]  Restrictions/Precautions:    Medical/Treatment Diagnosis Information:  Diagnosis: M75.121 (ICD-10-CM) - Complete tear of right rotator cuff  Treatment Diagnosis: m25.511 m25.611   Procedure performed: 2019  1. Right shoulder diagnostic arthroscopy  2. Right shoulder arthroscopic labral debridement, synovectomy, undersurface rotator cuff debridement in the glenohumeral joint  3. Right shoulder arthroscopic rotator cuff repair using 2 row fixation and one ultra braid's side to side reapproximation of a L-shaped tear  4. Right shoulder arthroscopic subacromial decompression  5. Right shoulder arthroscopic distal clavicle excision  6. Right shoulder arthroscopic rotator cuff augmentation  7.   Right shoulder long head of the biceps tenodesed          Insurance/Certification information:  PT Insurance Information: anthem/40$copay  Physician Information:  Referring Practitioner: Shaheen Vasquez DO  Plan of care signed (Y/N):     Date of Patient follow up with Physician:     Functional disability 100% UEFS         Progress Note: []  Yes  []  No  Next due by: Visit #10 or wekk of       Latex Allergy:  [x]NO      []YES  Preferred Language for Healthcare:   [x]English       []other:    Visit # Insurance Allowable   2  5/10      eval unl  40$copay     Pain level:  2-3/10 rest 5/10     SUBJECTIVE:  5/10-doing well    OBJECTIVE:   ROM 5/10 PROM AROM  Comment     L R L R     Flexion   ~110         Abduction    ~110 scaption         ER   [email or web address]         IR             Other(cervical)             Other                    Strength not tested due to recent surgery  L R Comment   Flexion         Abduction         ER         IR         Supraspinatus         Upper Trap         Lower Trap         Mid Trap         Rhomboids         Biceps         Triceps         Horizontal Abduction         Horizontal Adduction         Lats            Special Tests Results/Comment   Neurologic Signs     Functional Reach        Reflexes/Sensation:               ? Dermatomes/Myotomes intact                             ? Other:     Joint mobility: not assessed due to recent surgery 5/7              ? Normal                       X?Hypo              ? Hyper     Palpation: general TTP due to recent surgery 5/7     Functional Mobility/Transfers: I with protective posture R UE/ultrasling 5/7     Posture: WFL for 76year old female, ultra sling in place 5/7     Bandages/Dressings/Incisions: portals closed, no signs of infection 5/7     Gait: (include devices/WB status): WNL, R UE ultrasling 5/7                          ? Patient history, allergies, meds reviewed. Medical chart reviewed. See intake form. 5/7     Review Of Systems (ROS):  ?Performed Review of systems (Integumentary, CardioPulmonary, Neurological) by intake and observation. Intake form has been scanned into medical record. Patient has been instructed to contact their primary care physician regarding ROS issues if not already being addressed at this time.   5/7            RESTRICTIONS/PRECAUTIONS: RCR/bicep tenodesis protocol    Exercises/Interventions:   Exercises:  Exercise/Equipment Resistance/Repetitions Other comments   Stretching/PROM     Wand     Table Slides [id number] fl  [id number] abd   start5/10   UE [place]     Pulleys     Pendulum Dump x10 very small movements    Elbow PROM x10    Isometrics     Retraction 3x10     3x10    Weight shift     Flexion     Abduction     External Rotation     Internal Rotation     Biceps     Triceps          PRE's     Flexion     Abduction     External Rotation     Internal Rotation     Shrugs 3x10    EXT     Reverse Flys     Serratus     Horizontal Abd with ER     Biceps     Triceps     Retraction          Cable Column/Theraband     External Rotation     Internal Rotation     Shrugs     Lats     Ext     Flex     Scapular Retraction     BIC     TRIC     PNF          Dynamic Stability          Plyoback          Manual interventions                     Therapeutic Exercise and NMR EXR  [x] (98121) Provided verbal/tactile cueing for activities related to strengthening, flexibility, endurance, ROM  for improvements in scapular, scapulothoracic and UE control with self care, reaching, carrying, lifting, house/yardwork, driving/computer work.    [] (16217) Provided verbal/tactile cueing for activities related to improving balance, coordination, kinesthetic sense, posture, motor skill, proprioception  to assist with  scapular, scapulothoracic and UE control with self care, reaching, carrying, lifting, house/yardwork, driving/computer work. Therapeutic Activities:    [] (64739 or 79814) Provided verbal/tactile cueing for activities related to improving balance, coordination, kinesthetic sense, posture, motor skill, proprioception and motor activation to allow for proper function of scapular, scapulothoracic and UE control with self care, carrying, lifting, driving/computer work.      Home Exercise Program:    [x] (32707) Reviewed/Progressed HEP activities related to strengthening, flexibility, endurance, ROM of scapular, scapulothoracic and UE control with self care, reaching, carrying, lifting, house/yardwork, driving/computer work  [] (32314) Reviewed/Progressed HEP activities related to improving balance, coordination, kinesthetic sense, posture, motor skill, proprioception of scapular, scapulothoracic and UE control with self care, reaching, carrying, lifting, house/yardwork, driving/computer work      Manual Treatments:  PROM / STM / Oscillations-Mobs:  G-I, II, III, IV (PA's, Inf., Post.)  [] (54281) Provided manual therapy to mobilize soft tissue/joints of cervical/CT, scapular GHJ and UE for the purpose of modulating pain, promoting relaxation,  increasing ROM, reducing/eliminating soft tissue swelling/inflammation/restriction, improving soft tissue extensibility and allowing for proper ROM for normal function with self care, reaching, carrying, lifting, house/yardwork, driving/computer work    Modalities:  Ice 15 5/10    Charges:  Timed Code Treatment Minutes: 35   Total Treatment Minutes: 930-1025       [] EVAL (LOW) 02951 (typically 20 minutes face-to-face)  [] EVAL (MOD) 08985 (typically 30 minutes face-to-face)  [] EVAL (HIGH) 72716 (typically 45 minutes face-to-face)  [] RE-EVAL     [x] FX(99203) x  1   [] IONTO  [] NMR (94247) x      [] VASO  [x] Manual (44516) x  4    [] Other:  [] TA x       [] Mech Traction (71484)  [] ES(attended) (05518)      [] ES (un) (20120):     GOALS  [facility]     Therapist goals for Patient:   Short Term Goals: To be achieved in: 2-4 weeks  1. Independent in HEP and progression per patient tolerance, in order to prevent re-injury. 2. Patient will have a decrease in pain to facilitate improvement in movement, function, and ADLs as indicated by Functional Deficits.     Long Term Goals: To be achieved in: 16-24 weeks  1. Disability index score of 10% or less for the UEFS to assist with reaching prior level of function. 2. Patient will demonstrate increased AROM to *WFLto allow for proper joint functioning as indicated by patients Functional Deficits. 3. Patient will demonstrate an increase in Strength to good scapular and core control  for UE to allow for proper functional mobility as indicated by patients Functional Deficits. 4. Patient will return to  functional activities without increased symptoms or restriction. Light adls 7-29 weeks heavy adls 16 weeks  PLOF 24 weeks                    Progression Towards Functional goals:  [] Patient is progressing as expected towards functional goals listed. [] Progression is slowed due to complexities listed. [] Progression has been slowed due to co-morbidities. [x] Plan just implemented, too soon to assess goals progression  [] Other:     ASSESSMENT:  See eval    Treatment/Activity Tolerance:  [x] Patient tolerated treatment fair to well due to recent surgery 5/10 [] Patient limited by fatique  [] Patient limited by pain  [] Patient limited by other medical complications  [x] Other: Increased ROM noted. Pt required frequent verbal cues for proper technique of PROM activities 5/10     Patient education:5/7 Reviewed diagnosis, POC, HEP and its importance. precautions and restrictions reviewed for RCR and bicep tenodesis    Prognosis: [x] Good [] Fair  [] Poor    Patient Requires Follow-up: [x] Yes  [] No    PLAN: 1-2 days per week for 4 Weeks:5/7-6/7/2019      [x] Continue per plan of care [] Alter current plan (see comments)  [] Plan of care initiated [] Hold pending MD visit [] Discharge    Electronically signed by: Susan Morales PT,   *If patient does not return for further follow ups after this date. Please consider this as the patients discharge from physical therapy.

## 2019-05-13 ENCOUNTER — HOSPITAL ENCOUNTER (OUTPATIENT)
Dept: PHYSICAL THERAPY | Age: 69
Setting detail: THERAPIES SERIES
Discharge: HOME | End: 2019-05-13
Payer: MEDICARE

## 2019-05-13 PROCEDURE — 97140 MANUAL THERAPY 1/> REGIONS: CPT | Performed by: PHYSICAL THERAPIST

## 2019-05-13 PROCEDURE — 97110 THERAPEUTIC EXERCISES: CPT | Performed by: PHYSICAL THERAPIST

## 2019-05-13 NOTE — FLOWSHEET NOTE
The [address] [place] [address],[address], [address], [address] [place] [address]  Phone: [phone]   Fax:     [phone]    Physical Therapy Daily Treatment Note  Date:  2019    Patient Name:  Armen Ruelas    :  1950  MRN: [id number]  Restrictions/Precautions:    Medical/Treatment Diagnosis Information:  Diagnosis: M75.121 (ICD-10-CM) - Complete tear of right rotator cuff  Treatment Diagnosis: m25.511 m25.611   Procedure performed: 2019  1. Right shoulder diagnostic arthroscopy  2. Right shoulder arthroscopic labral debridement, synovectomy, undersurface rotator cuff debridement in the glenohumeral joint  3. Right shoulder arthroscopic rotator cuff repair using 2 row fixation and one ultra braid's side to side reapproximation of a L-shaped tear  4. Right shoulder arthroscopic subacromial decompression  5. Right shoulder arthroscopic distal clavicle excision  6. Right shoulder arthroscopic rotator cuff augmentation  7.   Right shoulder long head of the biceps tenodesed          Insurance/Certification information:  PT Insurance Information: anthem/40$copay  Physician Information:  Referring Practitioner: Tay Christie DO  Plan of care signed (Y/N):     Date of Patient follow up with Physician:     Functional disability 100% UEFS         Progress Note: []  Yes  []  No  Next due by: Visit #10 or wekk of       Latex Allergy:  [x]NO      []YES  Preferred Language for Healthcare:   [x]English       []other:    Visit # Insurance Allowable   3        eval unl  40$copay     Pain level:  2-3/10 rest 5/10     SUBJECTIVE:   a bit more sore    OBJECTIVE:   ROM 5/10 PROM AROM  Comment     L R L R     Flexion   ~110         Abduction    ~110 scaption         ER   Brenda@[employer]         IR             Other(cervical)             Other                    Strength not tested due to recent surgery  L R Comment   Flexion       Abduction         ER         IR         Supraspinatus         Upper Trap         Lower Trap         Mid Trap         Rhomboids         Biceps         Triceps         Horizontal Abduction         Horizontal Adduction         Lats            Special Tests Results/Comment   Neurologic Signs     Functional Reach        Reflexes/Sensation:               ? Dermatomes/Myotomes intact                             ? Other:     Joint mobility: not assessed due to recent surgery 5/7              ? Normal                       X?Hypo              ? Hyper     Palpation: general TTP due to recent surgery 5/7     Functional Mobility/Transfers: I with protective posture R UE/ultrasling 5/7     Posture: WFL for 76year old female, ultra sling in place 5/7     Bandages/Dressings/Incisions: portals closed, no signs of infection 5/7     Gait: (include devices/WB status): WNL, R UE ultrasling 5/7                          ? Patient history, allergies, meds reviewed. Medical chart reviewed. See intake form. 5/7     Review Of Systems (ROS):  ?Performed Review of systems (Integumentary, CardioPulmonary, Neurological) by intake and observation. Intake form has been scanned into medical record. Patient has been instructed to contact their primary care physician regarding ROS issues if not already being addressed at this time.   5/7            RESTRICTIONS/PRECAUTIONS: RCR/bicep tenodesis protocol    Exercises/Interventions:   Exercises:  Exercise/Equipment Resistance/Repetitions Other comments   Stretching/PROM     Wand     Table Slides [id number] fl  [id number] abd   start5/10   UE [facility] at 0 [id number] start5/13   Pulleys     Pendulum Dump x10 very small movements    Elbow PROM x10    Isometrics     Retraction 3x10     3x10    Weight shift     Flexion     Abduction     External Rotation     Internal Rotation     Biceps     Triceps          PRE's     Flexion     Abduction     External Rotation     Internal Rotation     Shrugs 3x10    EXT Reverse Flys     Serratus     Horizontal Abd with ER     Biceps     Triceps     Retraction          Cable Column/Theraband     External Rotation     Internal Rotation     Shrugs     Lats     Ext     Flex     Scapular Retraction     BIC     TRIC     PNF          Dynamic Stability          Plyoback          Manual interventions                     Therapeutic Exercise and NMR EXR  [x] (25446) Provided verbal/tactile cueing for activities related to strengthening, flexibility, endurance, ROM  for improvements in scapular, scapulothoracic and UE control with self care, reaching, carrying, lifting, house/yardwork, driving/computer work.    [] (16367) Provided verbal/tactile cueing for activities related to improving balance, coordination, kinesthetic sense, posture, motor skill, proprioception  to assist with  scapular, scapulothoracic and UE control with self care, reaching, carrying, lifting, house/yardwork, driving/computer work. Therapeutic Activities:    [] (19659 or 04117) Provided verbal/tactile cueing for activities related to improving balance, coordination, kinesthetic sense, posture, motor skill, proprioception and motor activation to allow for proper function of scapular, scapulothoracic and UE control with self care, carrying, lifting, driving/computer work.      Home Exercise Program:    [x] (16252) Reviewed/Progressed HEP activities related to strengthening, flexibility, endurance, ROM of scapular, scapulothoracic and UE control with self care, reaching, carrying, lifting, house/yardwork, driving/computer work  [] (23834) Reviewed/Progressed HEP activities related to improving balance, coordination, kinesthetic sense, posture, motor skill, proprioception of scapular, scapulothoracic and UE control with self care, reaching, carrying, lifting, house/yardwork, driving/computer work      Manual Treatments:  PROM / STM / Oscillations-Mobs:  G-I, II, III, IV (PA's, Inf., Post.)  [] (55176) Provided manual therapy to mobilize soft tissue/joints of cervical/CT, scapular GHJ and UE for the purpose of modulating pain, promoting relaxation,  increasing ROM, reducing/eliminating soft tissue swelling/inflammation/restriction, improving soft tissue extensibility and allowing for proper ROM for normal function with self care, reaching, carrying, lifting, house/yardwork, driving/computer work    Modalities:  Ice 15 5/13    Charges:  Timed Code Treatment Minutes: 35   Total Treatment Minutes: 370-539       [] EVAL (LOW) 71276 (typically 20 minutes face-to-face)  [] EVAL (MOD) 36302 (typically 30 minutes face-to-face)  [] EVAL (HIGH) 53481 (typically 45 minutes face-to-face)  [] RE-EVAL     [x] FM(87336) x  1   [] IONTO  [] NMR (23316) x      [] VASO  [x] Manual (65617) x  1    [] Other:  [] TA x       [] Mech Traction (59017)  [] ES(attended) (44651)      [] ES (un) (29462):     GOALS  Patient stated goal: return toPLOF     Therapist goals for Patient:   Short Term Goals: To be achieved in: 2-4 weeks  1. Independent in HEP and progression per patient tolerance, in order to prevent re-injury. 2. Patient will have a decrease in pain to facilitate improvement in movement, function, and ADLs as indicated by Functional Deficits.     Long Term Goals: To be achieved in: 16-24 weeks  1. Disability index score of 10% or less for the UEFS to assist with reaching prior level of function. 2. Patient will demonstrate increased AROM to *WFLto allow for proper joint functioning as indicated by patients Functional Deficits. 3. Patient will demonstrate an increase in Strength to good scapular and core control  for UE to allow for proper functional mobility as indicated by patients Functional Deficits. 4. Patient will return to  functional activities without increased symptoms or restriction.  Light adls 9-85 weeks heavy adls 16 weeks  PLOF 24 weeks                    Progression Towards Functional goals:  [] Patient is progressing as expected towards functional goals listed. [] Progression is slowed due to complexities listed. [] Progression has been slowed due to co-morbidities. [x] Plan just implemented, too soon to assess goals progression  [] Other:     ASSESSMENT:  See eval    Treatment/Activity Tolerance:  [x] Patient tolerated treatment fair to well due to recent surgery 5/13 [] Patient limited by fatique  [] Patient limited by pain  [] Patient limited by other medical complications  [x] Other: Pt required occasional  verbal cues for proper technique of PROM activities 5/13    Patient education:5/7 Reviewed diagnosis, POC, HEP and its importance. precautions and restrictions reviewed for RCR and bicep tenodesis    Prognosis: [x] Good [] Fair  [] Poor    Patient Requires Follow-up: [x] Yes  [] No    PLAN: 1-2 days per week for 4 Weeks:5/7-6/7/2019      [x] Continue per plan of care [] Alter current plan (see comments)  [] Plan of care initiated [] Hold pending MD visit [] Discharge    Electronically signed by: Manuel Cervantes PT,   *If patient does not return for further follow ups after this date. Please consider this as the patients discharge from physical therapy.

## 2019-05-16 ENCOUNTER — APPOINTMENT (OUTPATIENT)
Dept: PHYSICAL THERAPY | Age: 69
End: 2019-05-16
Payer: MEDICARE

## 2019-05-17 RX ORDER — TRAZODONE HYDROCHLORIDE 50 MG/1
TABLET ORAL
Qty: 60 TABLET | Refills: 5 | Status: SHIPPED | OUTPATIENT
Start: 2019-05-17 | End: 2019-07-10 | Stop reason: SDUPTHER

## 2019-05-20 ENCOUNTER — TELEPHONE (OUTPATIENT)
Dept: INTERNAL MEDICINE CLINIC | Age: 69
End: 2019-05-20

## 2019-05-20 ENCOUNTER — HOSPITAL ENCOUNTER (OUTPATIENT)
Dept: PHYSICAL THERAPY | Age: 69
Setting detail: THERAPIES SERIES
Discharge: HOME | End: 2019-05-20
Payer: MEDICARE

## 2019-05-20 PROCEDURE — 97110 THERAPEUTIC EXERCISES: CPT | Performed by: PHYSICAL THERAPIST

## 2019-05-20 PROCEDURE — 97140 MANUAL THERAPY 1/> REGIONS: CPT | Performed by: PHYSICAL THERAPIST

## 2019-05-20 NOTE — TELEPHONE ENCOUNTER
Called [employer] and they didn't download pt's profile at new [employer]. The new [employer] will get it ready for pt. Pt notified. Sample placed at  too so patient does not go without if something like this happens again .

## 2019-05-20 NOTE — FLOWSHEET NOTE
[facility], [address] [employer] [address] [place] [address], [address] [place] [address]  Phone: [phone]   Fax:     [phone]    Physical Therapy Daily Treatment Note  Date:  2019    Patient Name:  Indu Ramirez    :  1950  MRN: [id number]  Restrictions/Precautions:    Medical/Treatment Diagnosis Information:  Diagnosis: M75.121 (ICD-10-CM) - Complete tear of right rotator cuff  Treatment Diagnosis: m25.511 m25.611   Procedure performed: 2019  1. Right shoulder diagnostic arthroscopy  2. Right shoulder arthroscopic labral debridement, synovectomy, undersurface rotator cuff debridement in the glenohumeral joint  3. Right shoulder arthroscopic rotator cuff repair using 2 row fixation and one ultra braid's side to side reapproximation of a L-shaped tear  4. Right shoulder arthroscopic subacromial decompression  5. Right shoulder arthroscopic distal clavicle excision  6. Right shoulder arthroscopic rotator cuff augmentation  7.   Right shoulder long head of the biceps tenodesed          Insurance/Certification information:  PT Insurance Information: anthem/40$copay  Physician Information:  Referring Practitioner: Leif Stover DO  Plan of care signed (Y/N):     Date of Patient follow up with Physician:     Functional disability 100% UEFS         Progress Note: []  Yes  []  No  Next due by: Visit #10 or wekk of       Latex Allergy:  [x]NO      []YES  Preferred Language for Healthcare:   [x]English       []other:    Visit # Insurance Allowable   4        eval unl  40$copay     Pain level:  2-3/10 rest 5/10     SUBJECTIVE:   UT sore, sleeping remains difficult    OBJECTIVE:   ROM 5/10 PROM AROM  Comment     L R L R     Flexion   ~110         Abduction    ~110 scaption         ER   Dayan[employer]         IR             Other(cervical)             Other                    Strength not tested due to recent surgery  L R Comment Flexion         Abduction         ER         IR         Supraspinatus         Upper Trap         Lower Trap         Mid Trap         Rhomboids         Biceps         Triceps         Horizontal Abduction         Horizontal Adduction         Lats            Special Tests Results/Comment   Neurologic Signs     Functional Reach        Reflexes/Sensation:               ? Dermatomes/Myotomes intact                             ? Other:     Joint mobility: not assessed due to recent surgery 5/7              ? Normal                       X?Hypo              ? Hyper     Palpation: general TTP due to recent surgery 5/7     Functional Mobility/Transfers: I with protective posture R UE/ultrasling 5/7     Posture: WFL for 76year old female, ultra sling in place 5/7     Bandages/Dressings/Incisions: portals closed, no signs of infection 5/7     Gait: (include devices/WB status): WNL, R UE ultrasling 5/7                          ? Patient history, allergies, meds reviewed. Medical chart reviewed. See intake form. 5/7     Review Of Systems (ROS):  ?Performed Review of systems (Integumentary, CardioPulmonary, Neurological) by intake and observation. Intake form has been scanned into medical record. Patient has been instructed to contact their primary care physician regarding ROS issues if not already being addressed at this time.   5/7            RESTRICTIONS/PRECAUTIONS: RCR/bicep tenodesis protocol    Exercises/Interventions:   Exercises:  Exercise/Equipment Resistance/Repetitions Other comments   Stretching/PROM     Wand     Table Slides [id number] fl  [id number] abd   start5/10   UE [facility] ER at 0 44m16flu start5/13   Pulleys     Pendulum Dump x10 very small movements    Elbow PROM x10    Isometrics     Retraction 3x10     3x10    Weight shift     Flexion     Abduction     External Rotation     Internal Rotation     Biceps     Triceps          PRE's     Flexion     Abduction     External Rotation     Internal Rotation     Shrugs 3x10    EXT     Reverse Flys     Serratus     Horizontal Abd with ER     Biceps     Triceps     Retraction          Cable Column/Theraband     External Rotation     Internal Rotation     Shrugs     Lats     Ext     Flex     Scapular Retraction     BIC     TRIC     PNF          Dynamic Stability          Plyoback          Manual interventions     PROM, IASTM UT 12 min               Therapeutic Exercise and NMR EXR  [x] (91301) Provided verbal/tactile cueing for activities related to strengthening, flexibility, endurance, ROM  for improvements in scapular, scapulothoracic and UE control with self care, reaching, carrying, lifting, house/yardwork, driving/computer work.    [] (26594) Provided verbal/tactile cueing for activities related to improving balance, coordination, kinesthetic sense, posture, motor skill, proprioception  to assist with  scapular, scapulothoracic and UE control with self care, reaching, carrying, lifting, house/yardwork, driving/computer work. Therapeutic Activities:    [] (37172 or 65040) Provided verbal/tactile cueing for activities related to improving balance, coordination, kinesthetic sense, posture, motor skill, proprioception and motor activation to allow for proper function of scapular, scapulothoracic and UE control with self care, carrying, lifting, driving/computer work.      Home Exercise Program:    [x] (20947) Reviewed/Progressed HEP activities related to strengthening, flexibility, endurance, ROM of scapular, scapulothoracic and UE control with self care, reaching, carrying, lifting, house/yardwork, driving/computer work  [] (45110) Reviewed/Progressed HEP activities related to improving balance, coordination, kinesthetic sense, posture, motor skill, proprioception of scapular, scapulothoracic and UE control with self care, reaching, carrying, lifting, house/yardwork, driving/computer work      Manual Treatments:  PROM / STM / Oscillations-Mobs:  G-I, II, III, IV (PA's, Inf., goals:  [] Patient is progressing as expected towards functional goals listed. [] Progression is slowed due to complexities listed. [] Progression has been slowed due to co-morbidities. [x] Plan just implemented, too soon to assess goals progression  [] Other:     ASSESSMENT:  See eval    Treatment/Activity Tolerance:  [x] Patient tolerated treatment fair to well due to recent surgery 5/20  [] Patient limited by fatique  [] Patient limited by pain  [] Patient limited by other medical complications  [x] Other: Pt required occasional  verbal cues for proper technique of PROM activities. Initiated UT IASTM due to tightness form  Being in sling and increased activities with sling on. Patient education:5/7 Reviewed diagnosis, POC, HEP and its importance. precautions and restrictions reviewed for RCR and bicep tenodesis  5/20 reviewed activity moderation even while in sling    Prognosis: [x] Good [] Fair  [] Poor    Patient Requires Follow-up: [x] Yes  [] No    PLAN: 1-2 days per week for 4 Weeks:5/7-6/7/2019      [x] Continue per plan of care [] Alter current plan (see comments)  [] Plan of care initiated [] Hold pending MD visit [] Discharge    Electronically signed by: Anju Prado PT,   *If patient does not return for further follow ups after this date. Please consider this as the patients discharge from physical therapy.

## 2019-05-20 NOTE — TELEPHONE ENCOUNTER
Please see [employer] message sent from patient yesterday. She would like a call back regarding this message asap. Please contact patient at the number provided.

## 2019-05-22 ENCOUNTER — HOSPITAL ENCOUNTER (OUTPATIENT)
Dept: PHYSICAL THERAPY | Age: 69
Setting detail: THERAPIES SERIES
Discharge: HOME | End: 2019-05-22
Payer: MEDICARE

## 2019-05-22 NOTE — FLOWSHEET NOTE
Physical Therapy  Cancellation/No-show Note  Patient Name:  Naomie Yoo  :  1950   Date:  2019  Cancelled visits to date: 0  No-shows to date: 0    For today's appointment patient:  [x]  Cancelled  []  Rescheduled appointment  []  No-show     Reason given by patient:  []  Patient ill  []  Conflicting appointment  []  No transportation    []  Conflict with work  []  No reason given  [x]  Other:  Bad night   Comments:      Electronically signed by:  Wilfredo Irvin PT

## 2019-05-23 ENCOUNTER — HOSPITAL ENCOUNTER (OUTPATIENT)
Dept: PHYSICAL THERAPY | Age: 69
Setting detail: THERAPIES SERIES
Discharge: HOME | End: 2019-05-23
Payer: MEDICARE

## 2019-05-23 PROCEDURE — 97110 THERAPEUTIC EXERCISES: CPT | Performed by: PHYSICAL THERAPIST

## 2019-05-23 PROCEDURE — 97140 MANUAL THERAPY 1/> REGIONS: CPT | Performed by: PHYSICAL THERAPIST

## 2019-05-23 NOTE — FLOWSHEET NOTE
External Rotation     Internal Rotation     Shrugs 3x10    EXT     Reverse Flys     Serratus     Horizontal Abd with ER     Biceps     Triceps     Retraction          Cable Column/Theraband     External Rotation     Internal Rotation     Shrugs     Lats     Ext     Flex     Scapular Retraction     BIC     TRIC     PNF          Dynamic Stability          Plyoback          Manual interventions     PROM, IASTM UT 12 min total               Therapeutic Exercise and NMR EXR  [x] (96068) Provided verbal/tactile cueing for activities related to strengthening, flexibility, endurance, ROM  for improvements in scapular, scapulothoracic and UE control with self care, reaching, carrying, lifting, house/yardwork, driving/computer work.    [] (60107) Provided verbal/tactile cueing for activities related to improving balance, coordination, kinesthetic sense, posture, motor skill, proprioception  to assist with  scapular, scapulothoracic and UE control with self care, reaching, carrying, lifting, house/yardwork, driving/computer work. Therapeutic Activities:    [] (66789 or 46309) Provided verbal/tactile cueing for activities related to improving balance, coordination, kinesthetic sense, posture, motor skill, proprioception and motor activation to allow for proper function of scapular, scapulothoracic and UE control with self care, carrying, lifting, driving/computer work.      Home Exercise Program:    [x] (06550) Reviewed/Progressed HEP activities related to strengthening, flexibility, endurance, ROM of scapular, scapulothoracic and UE control with self care, reaching, carrying, lifting, house/yardwork, driving/computer work  [] (79618) Reviewed/Progressed HEP activities related to improving balance, coordination, kinesthetic sense, posture, motor skill, proprioception of scapular, scapulothoracic and UE control with self care, reaching, carrying, lifting, house/yardwork, driving/computer work      Manual Treatments:  PROM / STM / Oscillations-Mobs:  G-I, II, III, IV (PA's, Inf., Post.)  [x] (15194) Provided manual therapy to mobilize soft tissue/joints of cervical/CT, scapular GHJ and UE for the purpose of modulating pain, promoting relaxation,  increasing ROM, reducing/eliminating soft tissue swelling/inflammation/restriction, improving soft tissue extensibility and allowing for proper ROM for normal function with self care, reaching, carrying, lifting, house/yardwork, driving/computer work    Modalities:      Charges:  Timed Code Treatment Minutes: 35   Total Treatment Minutes: [phone]       [] EVAL (LOW) 69163 (typically 20 minutes face-to-face)  [] EVAL (MOD) 54694 (typically 30 minutes face-to-face)  [] EVAL (HIGH) 33268 (typically 45 minutes face-to-face)  [] RE-EVAL     [x] EI(47603) x  1   [] IONTO  [] NMR (49967) x      [] VASO  [x] Manual (41088) x  1    [] Other:  [] TA x       [] Mech Traction (55275)  [] ES(attended) (62553)      [] ES (un) (09578):     GOALS  Patient stated goal: return toPLOF     Therapist goals for Patient:   Short Term Goals: To be achieved in: 2-4 weeks  1. Independent in HEP and progression per patient tolerance, in order to prevent re-injury. 2. Patient will have a decrease in pain to facilitate improvement in movement, function, and ADLs as indicated by Functional Deficits.     Long Term Goals: To be achieved in: 16-24 weeks  1. Disability index score of 10% or less for the UEFS to assist with reaching prior level of function. 2. Patient will demonstrate increased AROM to *WFLto allow for proper joint functioning as indicated by patients Functional Deficits. 3. Patient will demonstrate an increase in Strength to good scapular and core control  for UE to allow for proper functional mobility as indicated by patients Functional Deficits. 4. Patient will return to  functional activities without increased symptoms or restriction.  Light adls 6-06 weeks heavy adls 16 weeks  PLOF 24 weeks

## 2019-05-28 ENCOUNTER — HOSPITAL ENCOUNTER (OUTPATIENT)
Dept: PHYSICAL THERAPY | Age: 69
Setting detail: THERAPIES SERIES
Discharge: HOME | End: 2019-05-28
Payer: MEDICARE

## 2019-05-28 PROCEDURE — 97140 MANUAL THERAPY 1/> REGIONS: CPT

## 2019-05-28 PROCEDURE — 97110 THERAPEUTIC EXERCISES: CPT

## 2019-05-28 NOTE — FLOWSHEET NOTE
The [address] [place] [address],[address], [address]  Phone: [phone]   Fax:     [phone]    Physical Therapy Daily Treatment Note  Date:  2019    Patient Name:  Roxann Mora    :  1950  MRN: [id number]  Restrictions/Precautions:    Medical/Treatment Diagnosis Information:  Diagnosis: M75.121 (ICD-10-CM) - Complete tear of right rotator cuff  Treatment Diagnosis: m25.511 m25.611   Procedure performed: 2019  1. Right shoulder diagnostic arthroscopy  2. Right shoulder arthroscopic labral debridement, synovectomy, undersurface rotator cuff debridement in the glenohumeral joint  3. Right shoulder arthroscopic rotator cuff repair using 2 row fixation and one ultra braid's side to side reapproximation of a L-shaped tear  4. Right shoulder arthroscopic subacromial decompression  5. Right shoulder arthroscopic distal clavicle excision  6. Right shoulder arthroscopic rotator cuff augmentation  7. Right shoulder long head of the biceps tenodesed          Insurance/Certification information:  PT Insurance Information: anthem/40$copay  Physician Information:  Referring Practitioner: Kirill Kasper DO  Plan of care signed (Y/N):     Date of Patient follow up with Physician:     Functional disability 100% UEFS         Progress Note: []  Yes  []  No  Next due by: Visit #10 or wekk of       Latex Allergy:  [x]NO      []YES  Preferred Language for Healthcare:   [x]English       []other:    Visit # Insurance Allowable   6        eval unl  40$copay     Pain level:  2-3/10 rest 5/10     SUBJECTIVE:    Pt feels she is just achy. She feels that she is probably out of her sling more often then she should be however she is not actively lifting her UE and lifting weight.      OBJECTIVE:   ROM  PROM AROM  Comment     L R L R     Flexion   ~130         Abduction    ~130 scaption         ER   [email or web address]       IR             Other(cervical)             Other                    Strength not tested due to recent surgery 5/7 L R Comment   Flexion         Abduction         ER         IR         Supraspinatus         Upper Trap         Lower Trap         Mid Trap         Rhomboids         Biceps         Triceps         Horizontal Abduction         Horizontal Adduction         Lats            Special Tests Results/Comment   Neurologic Signs     Functional Reach        Reflexes/Sensation:               ? Dermatomes/Myotomes intact                             ? Other:     Joint mobility: not assessed due to recent surgery 5/7              ? Normal                       X?Hypo              ? Hyper     Palpation: general TTP due to recent surgery 5/7     Functional Mobility/Transfers: I with protective posture R UE/ultrasling 5/7     Posture: WFL for 76year old female, ultra sling in place 5/7     Bandages/Dressings/Incisions: portals closed, no signs of infection 5/7     Gait: (include devices/WB status): WNL, R UE ultrasling 5/7                          ? Patient history, allergies, meds reviewed. Medical chart reviewed. See intake form. 5/7     Review Of Systems (ROS):  ?Performed Review of systems (Integumentary, CardioPulmonary, Neurological) by intake and observation. Intake form has been scanned into medical record. Patient has been instructed to contact their primary care physician regarding ROS issues if not already being addressed at this time.   5/7            RESTRICTIONS/PRECAUTIONS: RCR/bicep tenodesis protocol    Exercises/Interventions:   Exercises:  Exercise/Equipment Resistance/Repetitions Other comments   Stretching/PROM     Wand     Table Slides 82e3ies fl  67l2res abd   start5/10   UE [place] ER at 0 [id number] Supine 5/28   Supine grab wrist with L hand and raise into flexion [id number]   start5/23   Pendulum Dump x30 very small movements    Elbow PROM x30    Isometrics     Retraction 3x10     HEP    Side lying scap retraction/ depression 5\"hx10 R 5/28   Flexion     Abduction     External Rotation     Internal Rotation     Biceps     Triceps          PRE's     Flexion     Abduction     External Rotation     Internal Rotation     Shrugs 3x10    EXT     Reverse Flys     Serratus     Horizontal Abd with ER     Biceps     Triceps     Retraction          Cable Column/Theraband     External Rotation     Internal Rotation     Shrugs     Lats     Ext     Flex     Scapular Retraction     BIC     TRIC     PNF          Dynamic Stability          Plyoback          Manual interventions     PROM, IASTM UT 15 min total               Therapeutic Exercise and NMR EXR  [x] (94294) Provided verbal/tactile cueing for activities related to strengthening, flexibility, endurance, ROM  for improvements in scapular, scapulothoracic and UE control with self care, reaching, carrying, lifting, house/yardwork, driving/computer work.    [] (13762) Provided verbal/tactile cueing for activities related to improving balance, coordination, kinesthetic sense, posture, motor skill, proprioception  to assist with  scapular, scapulothoracic and UE control with self care, reaching, carrying, lifting, house/yardwork, driving/computer work. Therapeutic Activities:    [] (02925 or 60196) Provided verbal/tactile cueing for activities related to improving balance, coordination, kinesthetic sense, posture, motor skill, proprioception and motor activation to allow for proper function of scapular, scapulothoracic and UE control with self care, carrying, lifting, driving/computer work.      Home Exercise Program:    [x] (55236) Reviewed/Progressed HEP activities related to strengthening, flexibility, endurance, ROM of scapular, scapulothoracic and UE control with self care, reaching, carrying, lifting, house/yardwork, driving/computer work  [] (17865) Reviewed/Progressed HEP activities related to improving balance, coordination, kinesthetic sense, posture, motor

## 2019-05-31 ENCOUNTER — HOSPITAL ENCOUNTER (OUTPATIENT)
Dept: PHYSICAL THERAPY | Age: 69
Setting detail: THERAPIES SERIES
Discharge: HOME | End: 2019-05-31
Payer: MEDICARE

## 2019-05-31 PROCEDURE — 97140 MANUAL THERAPY 1/> REGIONS: CPT

## 2019-05-31 PROCEDURE — 97110 THERAPEUTIC EXERCISES: CPT

## 2019-05-31 NOTE — FLOWSHEET NOTE
The [address] [place] [address],[address], [address], [address] [place] [address]  Phone: [phone]   Fax:     [phone]    Physical Therapy Daily Treatment Note  Date:  2019    Patient Name:  Indu Ramirez    :  1950  MRN: [id number]  Restrictions/Precautions:    Medical/Treatment Diagnosis Information:  Diagnosis: M75.121 (ICD-10-CM) - Complete tear of right rotator cuff  Treatment Diagnosis: m25.511 m25.611   Procedure performed: 2019  1. Right shoulder diagnostic arthroscopy  2. Right shoulder arthroscopic labral debridement, synovectomy, undersurface rotator cuff debridement in the glenohumeral joint  3. Right shoulder arthroscopic rotator cuff repair using 2 row fixation and one ultra braid's side to side reapproximation of a L-shaped tear  4. Right shoulder arthroscopic subacromial decompression  5. Right shoulder arthroscopic distal clavicle excision  6. Right shoulder arthroscopic rotator cuff augmentation  7. Right shoulder long head of the biceps tenodesed          Insurance/Certification information:  PT Insurance Information: anthem/40$copay  Physician Information:  Referring Practitioner: Leif Stover DO  Plan of care signed (Y/N):     Date of Patient follow up with Physician:     Functional disability 100% UEFS         Progress Note: []  Yes  []  No  Next due by: Visit #10 or wekk of       Latex Allergy:  [x]NO      []YES  Preferred Language for Healthcare:   [x]English       []other:    Visit # Insurance Allowable   7        eval unl  40$copay     Pain level:  2-3/10 rest 5/10     SUBJECTIVE:  : Pt states that shoulder continues to be achy and wakes up throughout the night d/t inability to get comfortable. No adverse effects after last visit or with HEP. States her neck is not as sore as it has been.     OBJECTIVE:   ROM  PROM AROM  Comment     L R L R     Flexion   ~130         Abduction   Retraction 3x10     HEP    Side lying scap retraction/ depression 5\"hx20 R 5/31 with manual assistance to facilitate stretch at cervical muscles   Flexion     Abduction     External Rotation     Internal Rotation     Biceps     Triceps          PRE's     Flexion     Abduction     External Rotation     Internal Rotation     Shrugs 3x10    EXT     Reverse Flys     Serratus     Horizontal Abd with ER     Biceps     Triceps     Retraction          Cable Column/Theraband     External Rotation     Internal Rotation     Shrugs     Lats     Ext     Flex     Scapular Retraction     BIC     TRIC     PNF          Dynamic Stability          Plyoback          Manual interventions     PROM, IASTM UT 15 min total               Therapeutic Exercise and NMR EXR  [x] (42900) Provided verbal/tactile cueing for activities related to strengthening, flexibility, endurance, ROM  for improvements in scapular, scapulothoracic and UE control with self care, reaching, carrying, lifting, house/yardwork, driving/computer work.    [] (33700) Provided verbal/tactile cueing for activities related to improving balance, coordination, kinesthetic sense, posture, motor skill, proprioception  to assist with  scapular, scapulothoracic and UE control with self care, reaching, carrying, lifting, house/yardwork, driving/computer work. Therapeutic Activities:    [] (76806 or 00824) Provided verbal/tactile cueing for activities related to improving balance, coordination, kinesthetic sense, posture, motor skill, proprioception and motor activation to allow for proper function of scapular, scapulothoracic and UE control with self care, carrying, lifting, driving/computer work.      Home Exercise Program:    [x] (56660) Reviewed/Progressed HEP activities related to strengthening, flexibility, endurance, ROM of scapular, scapulothoracic and UE control with self care, reaching, carrying, lifting, house/yardwork, driving/computer work  [] (22279) Reviewed/Progressed HEP activities related to improving balance, coordination, kinesthetic sense, posture, motor skill, proprioception of scapular, scapulothoracic and UE control with self care, reaching, carrying, lifting, house/yardwork, driving/computer work      Manual Treatments:  PROM / STM / Oscillations-Mobs:  G-I, II, III, IV (PA's, Inf., Post.)  [x] (18828) Provided manual therapy to mobilize soft tissue/joints of cervical/CT, scapular GHJ and UE for the purpose of modulating pain, promoting relaxation,  increasing ROM, reducing/eliminating soft tissue swelling/inflammation/restriction, improving soft tissue extensibility and allowing for proper ROM for normal function with self care, reaching, carrying, lifting, house/yardwork, driving/computer work    Modalities: Ice to go 5/31    Charges:  Timed Code Treatment Minutes: 34   Total Treatment Minutes: 9:23-10:01       [] EVAL (LOW) 59966 (typically 20 minutes face-to-face)  [] EVAL (MOD) 31562 (typically 30 minutes face-to-face)  [] EVAL (HIGH) 44555 (typically 45 minutes face-to-face)  [] RE-EVAL     [x] CF(28870) x  1   [] IONTO  [] NMR (62135) x      [] VASO  [x] Manual (64874) x  1    [] Other:  [] TA x       [] Mech Traction (35566)  [] ES(attended) (29163)      [] ES (un) (44877):     GOALS  Patient stated goal: return toPLOF     Therapist goals for Patient:   Short Term Goals: To be achieved in: 2-4 weeks  1. Independent in HEP and progression per patient tolerance, in order to prevent re-injury. 2. Patient will have a decrease in pain to facilitate improvement in movement, function, and ADLs as indicated by Functional Deficits.     Long Term Goals: To be achieved in: 16-24 weeks  1. Disability index score of 10% or less for the UEFS to assist with reaching prior level of function. 2. Patient will demonstrate increased AROM to *WFLto allow for proper joint functioning as indicated by patients Functional Deficits.    3. Patient will demonstrate an increase in Strength to good scapular and core control  for UE to allow for proper functional mobility as indicated by patients Functional Deficits. 4. Patient will return to  functional activities without increased symptoms or restriction. Light adls 6-60 weeks heavy adls 16 weeks  PLOF 24 weeks                    Progression Towards Functional goals:  [] Patient is progressing as expected towards functional goals listed. [] Progression is slowed due to complexities listed. [] Progression has been slowed due to co-morbidities. [x] Plan just implemented, too soon to assess goals progression  [] Other:     ASSESSMENT:  See eval    Treatment/Activity Tolerance:  [x] Patient tolerated treatment fair to well due to recent surgery 5/23  [] Patient limited by fatique  [] Patient limited by pain  [] Patient limited by other medical complications  [x] Other:   5/31/19: ROM progressing well. Demonstrated tightness at R levator d/t guarding R shoulder into elevated and protracted posture. Responded well to scapular depression/ retraction as well as IASTM. Patient education:5/7 Reviewed diagnosis, POC, HEP and its importance. precautions and restrictions reviewed for RCR and bicep tenodesis  5/20 reviewed activity moderation even while in sling    Prognosis: [x] Good [] Fair  [] Poor    Patient Requires Follow-up: [x] Yes  [] No    PLAN: 1-2 days per week for 4 Weeks:5/7-6/7/2019      [x] Continue per plan of care [] Alter current plan (see comments)  [] Plan of care initiated [] Hold pending MD visit [] Discharge    Electronically signed by: Pippa Childress PT,   *If patient does not return for further follow ups after this date. Please consider this as the patients discharge from physical therapy.

## 2019-06-04 ENCOUNTER — HOSPITAL ENCOUNTER (OUTPATIENT)
Dept: PHYSICAL THERAPY | Age: 69
Setting detail: THERAPIES SERIES
Discharge: HOME | End: 2019-06-04
Payer: MEDICARE

## 2019-06-04 PROCEDURE — 97140 MANUAL THERAPY 1/> REGIONS: CPT | Performed by: PHYSICAL THERAPIST

## 2019-06-04 PROCEDURE — 97110 THERAPEUTIC EXERCISES: CPT | Performed by: PHYSICAL THERAPIST

## 2019-06-04 NOTE — FLOWSHEET NOTE
The [address] [place] [address],[address], [address], [address] [place] [address]  Phone: [phone]   Fax:     [phone]    Physical Therapy Daily Treatment Note  Date:  2019    Patient Name:  Antonieta Davis    :  1950  MRN: [id number]  Restrictions/Precautions:    Medical/Treatment Diagnosis Information:  Diagnosis: M75.121 (ICD-10-CM) - Complete tear of right rotator cuff  Treatment Diagnosis: m25.511 m25.611   Procedure performed: 2019  1. Right shoulder diagnostic arthroscopy  2. Right shoulder arthroscopic labral debridement, synovectomy, undersurface rotator cuff debridement in the glenohumeral joint  3. Right shoulder arthroscopic rotator cuff repair using 2 row fixation and one ultra braid's side to side reapproximation of a L-shaped tear  4. Right shoulder arthroscopic subacromial decompression  5. Right shoulder arthroscopic distal clavicle excision  6. Right shoulder arthroscopic rotator cuff augmentation  7. Right shoulder long head of the biceps tenodesed          Insurance/Certification information:  PT Insurance Information: anthem/40$copay  Physician Information:  Referring Practitioner: Theron Montgomery DO  Plan of care signed (Y/N):     Date of Patient follow up with Physician:     Functional disability 100% UEFS         Progress Note: []  Yes  []  No  Next due by: Visit #10 or wekk of       Latex Allergy:  [x]NO      []YES  Preferred Language for Healthcare:   [x]English       []other:    Visit # Insurance Allowable   8    POC      eval unl  40$copay     Pain level:  2-3/10 rest 5/10     SUBJECTIVE:  :did not wear sling today. Pt continues to state that shoulder continues to be achy and wakes up throughout the night d/t inability to get comfortable. No adverse effects after last visit or with HEP. States her neck is not as sore as it has been.     OBJECTIVE:   ROM  PROM AROM  Comment     L R L R   Flexion   ~136  6/4         Abduction    ~130 scaption         ER   [email or web address]         IR             Other(cervical)             Other                    Strength not tested due to recent surgery 5/7 L R Comment   Flexion         Abduction         ER         IR         Supraspinatus         Upper Trap         Lower Trap         Mid Trap         Rhomboids         Biceps         Triceps         Horizontal Abduction         Horizontal Adduction         Lats            Special Tests Results/Comment   Neurologic Signs     Functional Reach        Reflexes/Sensation:               ? Dermatomes/Myotomes intact                             ? Other:     Joint mobility: not assessed due to recent surgery 5/7              ? Normal                       X?Hypo              ? Hyper     Palpation: general TTP due to recent surgery 5/7     Functional Mobility/Transfers: I with protective posture R UE/ultrasling 5/7     Posture: WFL for 76year old female, ultra sling in place 5/7     Bandages/Dressings/Incisions: portals closed, no signs of infection 5/7     Gait: (include devices/WB status): WNL, R UE ultrasling 5/7                          ? Patient history, allergies, meds reviewed. Medical chart reviewed. See intake form. 5/7     Review Of Systems (ROS):  ?Performed Review of systems (Integumentary, CardioPulmonary, Neurological) by intake and observation. Intake form has been scanned into medical record. Patient has been instructed to contact their primary care physician regarding ROS issues if not already being addressed at this time.   5/7            RESTRICTIONS/PRECAUTIONS: RCR/bicep tenodesis protocol    Exercises/Interventions:   Exercises:  Exercise/Equipment Resistance/Repetitions Other comments   Stretching/PROM     Wand     Table Slides [id number] fl  28m8gbd abd   start5/10   UE [place] ER at 0 [id number] Supine 5/28   Supine grab wrist with L hand and raise into flexion [id number]   start5/23   Pendulum Dump x30 very small 3. Patient will demonstrate an increase in Strength to good scapular and core control  for UE to allow for proper functional mobility as indicated by patients Functional Deficits. 4. Patient will return to  functional activities without increased symptoms or restriction. Light adls 6-07 weeks heavy adls 16 weeks  PLOF 24 weeks                    Progression Towards Functional goals:  [] Patient is progressing as expected towards functional goals listed. [] Progression is slowed due to complexities listed. [] Progression has been slowed due to co-morbidities. [x] Plan just implemented, too soon to assess goals progression  [] Other:     ASSESSMENT:  See eval    Treatment/Activity Tolerance:  [x] Patient tolerated treatment fair to well due to recent surgery 6/4 [] Patient limited by fatique  [] Patient limited by pain  [] Patient limited by other medical complications  [x] Other:   6/4: ROM progressing well. Demonstrated tightness at R levator d/t guarding R shoulder into elevated and protracted posture. continues to respond well to scapular depression/ retraction as well as IASTM. Patient education:5/7 Reviewed diagnosis, POC, HEP and its importance. precautions and restrictions reviewed for RCR and bicep tenodesis  5/20 reviewed activity moderation even while in sling  6/4 wear sling 24/7 unless exercising until see MD    Prognosis: [x] Good [] Fair  [] Poor    Patient Requires Follow-up: [x] Yes  [] No    PLAN: 1-2 days per week for 4 Weeks:5/7-6/7/2019      [x] Continue per plan of care [] Alter current plan (see comments)  [] Plan of care initiated [] Hold pending MD visit [] Discharge    Electronically signed by: Shaun Sellers PT,   *If patient does not return for further follow ups after this date. Please consider this as the patients discharge from physical therapy.

## 2019-06-06 ENCOUNTER — HOSPITAL ENCOUNTER (OUTPATIENT)
Dept: PHYSICAL THERAPY | Age: 69
Setting detail: THERAPIES SERIES
Discharge: HOME | End: 2019-06-06
Payer: MEDICARE

## 2019-06-06 PROCEDURE — 97140 MANUAL THERAPY 1/> REGIONS: CPT | Performed by: PHYSICAL THERAPIST

## 2019-06-06 PROCEDURE — 97110 THERAPEUTIC EXERCISES: CPT | Performed by: PHYSICAL THERAPIST

## 2019-06-06 NOTE — PLAN OF CARE
[facility], [address] [place] [address], [address] [place] [address]  Phone: [phone]   Fax:     [phone]   Physical Therapy Re-Certification Plan of Care    Dear Referring Practitioner: Lolly Downey, DO,    We had the pleasure of treating the following patient for physical therapy services at [address] [place] [address]. A summary of our findings can be found in the updated assessment below. This includes our plan of care. If you have any questions or concerns regarding these findings, please do not hesitate to contact me at the office phone number checked above. Thank you for the referral.     Physician Signature:________________________________Date:__________________  By signing above (or electronic signature), therapists plan is approved by physician      Overall Response to Treatment:   [x]Patient is responding well to treatment and improvement is noted with regards  to goals   []Patient should continue to improve in reasonable time if they continue HEP   []Patient has plateaued and is no longer responding to skilled PT intervention    []Patient is getting worse and would benefit from return to referring MD   []Patient unable to adhere to initial POC   [x]Other: pt on track at 4 weeks post surgery. Pt does note a fair amount of soreness and aching  which may be due to over doing it with adls and work even though she is wearing sling. Date range of previous POC Visits: -    Total Visits: 9          Physical Therapy Daily Treatment Note  Date:  2019    Patient Name:  Sonal Givens    :  1950  MRN: [id number]  Restrictions/Precautions:    Medical/Treatment Diagnosis Information:  Diagnosis: M75.121 (ICD-10-CM) - Complete tear of right rotator cuff  Treatment Diagnosis: m25.511 m25.611   Procedure performed: 2019  1. Right shoulder diagnostic arthroscopy  2.   Right shoulder arthroscopic labral debridement, synovectomy, undersurface rotator cuff debridement in the glenohumeral joint  3. Right shoulder arthroscopic rotator cuff repair using 2 row fixation and one ultra braid's side to side reapproximation of a L-shaped tear  4. Right shoulder arthroscopic subacromial decompression  5. Right shoulder arthroscopic distal clavicle excision  6. Right shoulder arthroscopic rotator cuff augmentation  7. Right shoulder long head of the biceps tenodesed          Insurance/Certification information:  PT Insurance Information: [employer]/40$copay  Physician Information:  Referring Practitioner: Surya Arreola DO  Plan of care signed (Y/N):     Date of Patient follow up with Physician:     Functional disability 100% UEFS 5/7        Progress Note: []  Yes  []  No  Next due by: week of 8/6      Latex Allergy:  [x]NO      []YES  Preferred Language for Healthcare:   [x]English       []other:    Visit # Insurance Allowable   9  6/6  UEFS redo        eval5/7 unl  40$copay     Pain level:  2-3/10 rest 5/10     SUBJECTIVE:  6/6 sore and achey. See md next monday     OBJECTIVE:   ROM 6/6 PROM AROM  Comment     L R L R     Flexion   ~140           Abduction    ~140 scaption         ER   [email or web address]         IR             Other(cervical)             Other                    Strength   L R Comment   Flexion    to 90 for assessment purpose 6/6     Abduction    to 45 for assessment purpose 6/6     ER         IR         Supraspinatus         Upper Trap         Lower Trap         Mid Trap         Rhomboids         Biceps         Triceps         Horizontal Abduction         Horizontal Adduction         Lats            Special Tests Results/Comment   Neurologic Signs     Functional Reach        Reflexes/Sensation:               ? Dermatomes/Myotomes intact                             ? Other:     Joint mobility: not assessed due to recent surgery 5/7              ? Normal                       X?Hypo              ? Hyper     Palpation: general TTP  R shoulder , UT and c paraspinals 6/6      Functional Mobility/Transfers: I with protective posture R UE/ultrasling 6/6     Posture: WFL for 76year old female, ultra sling in place 5/7     Bandages/Dressings/Incisions:healed 6/6     Gait: (include devices/WB status): WNL, R UE ultrasling 6/6                          ? Patient history, allergies, meds reviewed. Medical chart reviewed. See intake form. 5/7     Review Of Systems (ROS):  ?Performed Review of systems (Integumentary, CardioPulmonary, Neurological) by intake and observation. Intake form has been scanned into medical record. Patient has been instructed to contact their primary care physician regarding ROS issues if not already being addressed at this time.   5/7            RESTRICTIONS/PRECAUTIONS: RCR/bicep tenodesis protocol    Exercises/Interventions:   Exercises:  Exercise/Equipment Resistance/Repetitions Other comments   Stretching/PROM     Wand     Table Slides 48c2mwn fl  40x7snh abd   start5/10   UE [place] [facility] at 0 [id number] Supine 5/28   Supine grab wrist with L hand and raise into flexion [id number]   start5/23   Hands on waist  [id number]              Pendulum Dump x30 very small movements    Elbow PROM x30    Isometrics     Retraction 3x10     HEP    Side lying scap retraction/ depression 5\"hx20 R 5/31 with manual assistance to facilitate stretch at cervical muscles   Flexion     Abduction     External Rotation     Internal Rotation     Biceps     Triceps          PRE's     Flexion     Abduction     External Rotation     Internal Rotation     Shrugs 3x10    EXT     Reverse Flys     Serratus     Horizontal Abd with ER     Biceps     Triceps     Retraction          Cable Column/Theraband     External Rotation     Internal Rotation     Shrugs     Lats     Ext     Flex     Scapular Retraction     BIC     TRIC     PNF          Dynamic Stability          Plyoback          Manual interventions     PROM, IASTM UT 15 min total lifting, house/yardwork, driving/computer work    Modalities:  Ice 12 6/6  Charges:  Timed Code Treatment Minutes: 35   Total Treatment Minutes: [id number]       [] EVAL (LOW) 25348 (typically 20 minutes face-to-face)  [] EVAL (MOD) 65793 (typically 30 minutes face-to-face)  [] EVAL (HIGH) 96446 (typically 45 minutes face-to-face)  [] RE-EVAL     [x] NA(53290) x  1   [] IONTO  [] NMR (31825) x      [] VASO  [x] Manual (36976) x  1    [] Other:  [] TA x       [] Mech Traction (43734)  [] ES(attended) (34878)      [] ES (un) (11388):     GOALS  Patient stated goal: return toPLOF     Therapist goals for Patient:   Short Term Goals: To be achieved in: 2-4 weeks  1. Independent in HEP and progression per patient tolerance, in order to prevent re-injury. MET  2. Patient will have a decrease in pain to facilitate improvement in movement, function, and ADLs as indicated by Functional Deficits. PARTIALLY MET     Long Term Goals: To be achieved in: 16-24 weeks  1. Disability index score of 10% or less for the UEFS to assist with reaching prior level of function. 2. Patient will demonstrate increased AROM to *WFLto allow for proper joint functioning as indicated by patients Functional Deficits. 3. Patient will demonstrate an increase in Strength to good scapular and core control  for UE to allow for proper functional mobility as indicated by patients Functional Deficits. 4. Patient will return to  functional activities without increased symptoms or restriction. Light adls 0-09 weeks heavy adls 16 weeks  PLOF 24 weeks                    Progression Towards Functional goals:  [x] Patient is progressing as expected towards functional goals listed. [] Progression is slowed due to complexities listed. [] Progression has been slowed due to co-morbidities.   [] Plan just implemented, too soon to assess goals progression  [] Other:     ASSESSMENT:  See POC 6/6    Treatment/Activity Tolerance:  [x] Patient tolerated treatment fair to well due to recent surgery 6/6 [] Patient limited by fatique  [] Patient limited by pain  [] Patient limited by other medical complications  [x] Other:   6/4: ROM progressing well. Demonstrated tightness at R levator d/t guarding R shoulder into elevated and protracted posture. continues to respond well to scapular depression/ retraction as well as IASTM. Patient education:5/7 Reviewed diagnosis, POC, HEP and its importance. precautions and restrictions reviewed for RCR and bicep tenodesis  5/20 reviewed activity moderation even while in sling  6/4 wear sling 24/7 unless exercising until see MD    Prognosis: [x] Good [] Fair  [] Poor    Patient Requires Follow-up: [x] Yes  [] No    PLAN: 1-2 days per week for 8 Weeks:6/6-8/6       [x] Continue per plan of care [] Alter current plan (see comments)  [] Plan of care initiated [] Hold pending MD visit [] Discharge    Electronically signed by: Daija Alvarez PT,   *If patient does not return for further follow ups after this date. Please consider this as the patients discharge from physical therapy.

## 2019-06-10 ENCOUNTER — OFFICE VISIT (OUTPATIENT)
Dept: ORTHOPEDIC SURGERY | Age: 69
End: 2019-06-10

## 2019-06-10 VITALS — WEIGHT: 158.07 LBS | BODY MASS INDEX: 26.99 KG/M2 | HEIGHT: 64 IN

## 2019-06-10 DIAGNOSIS — M75.121 NONTRAUMATIC COMPLETE TEAR OF RIGHT ROTATOR CUFF: Primary | ICD-10-CM

## 2019-06-10 PROCEDURE — 99024 POSTOP FOLLOW-UP VISIT: CPT | Performed by: ORTHOPAEDIC SURGERY

## 2019-06-10 RX ORDER — CELECOXIB 200 MG/1
200 CAPSULE ORAL DAILY
Qty: 90 CAPSULE | Refills: 2 | Status: SHIPPED | OUTPATIENT
Start: 2019-06-10 | End: 2019-07-10

## 2019-06-10 NOTE — PROGRESS NOTES
History of Present of Illness:  S/P Rotator Cuff Repair  The patient returns today for recheck right shoulder evaluation  6 weeks after rotator cuff repair    Examination:  Inspection reveals warm, dry, intact skin. There is no adenopathy. The distal neurovascular exam is grossly intact. Examination of the contralateral shoulder reveals no atrophy or deformity. The skin is warm and dry. Range of motion is within normal limits. There is no focal tenderness with palpation. Provocative SLAP, biceps tension, apprehension AC joint or rotator cuff tests are negative. Strength is graded 5/5 in all muscle groups outside of the rotator cuff. Rotator cuff strength is intact and strong. The distal neurovascular exam is grossly intact. Cervical spine: The skin is warm and dry. There is no swelling, warmth, or erythema. Range of motion is within normal limits. There is no paraspinal or spinous process tenderness. Spurling's sign is negative and did not produce shoulder pain. The distal neurovascular exam is grossly intact. Diagnostic Test Findings:    No orders of the defined types were placed in this encounter. Treatment Plan:  Overall I think she is doing very well she needs to work a little bit on her range of motion then I will see her back here prior to her moving in approximately 5 weeks      Disclaimer: \"This note was dictated with voice recognition software. Though review and correction are routine, we apologize for any errors. \"

## 2019-06-11 ENCOUNTER — HOSPITAL ENCOUNTER (OUTPATIENT)
Dept: PHYSICAL THERAPY | Age: 69
Setting detail: THERAPIES SERIES
Discharge: HOME | End: 2019-06-11
Payer: MEDICARE

## 2019-06-11 PROCEDURE — 97110 THERAPEUTIC EXERCISES: CPT | Performed by: PHYSICAL THERAPIST

## 2019-06-11 PROCEDURE — 97140 MANUAL THERAPY 1/> REGIONS: CPT | Performed by: PHYSICAL THERAPIST

## 2019-06-11 NOTE — FLOWSHEET NOTE
Abduction    to 45 for assessment purpose 6/6     ER         IR         Supraspinatus         Upper Trap         Lower Trap         Mid Trap         Rhomboids         Biceps         Triceps         Horizontal Abduction         Horizontal Adduction         Lats            Special Tests Results/Comment   Neurologic Signs     Functional Reach        Reflexes/Sensation:               ? Dermatomes/Myotomes intact                             ? Other:     Joint mobility: not assessed due to recent surgery 5/7              ? Normal                       X?Hypo              ? Hyper     Palpation: general TTP  R shoulder , UT and c paraspinals 6/6      Functional Mobility/Transfers: I with protective posture R UE/ultrasling 6/6     Posture: WFL for 76year old female, ultra sling in place 5/7     Bandages/Dressings/Incisions:healed 6/6     Gait: (include devices/WB status): WNL, R UE ultrasling 6/6                          ? Patient history, allergies, meds reviewed. Medical chart reviewed. See intake form. 5/7     Review Of Systems (ROS):  ?Performed Review of systems (Integumentary, CardioPulmonary, Neurological) by intake and observation. Intake form has been scanned into medical record. Patient has been instructed to contact their primary care physician regarding ROS issues if not already being addressed at this time.   5/7            RESTRICTIONS/PRECAUTIONS: RCR/bicep tenodesis protocol    Exercises/Interventions:   Exercises:  Exercise/Equipment Resistance/Repetitions Other comments   Stretching/PROM     Wand Next visit    Table Slides 55r0xdq fl  11f0gtq abd   start5/10   UE [place] [facility] at 0 [id number] Supine 5/28   Supine grab wrist with L hand and raise into flexion 59l35uzh   start5/23   Hands on waist  35ztpr1    IR Next visit         Pendulum Dump x30 very small movements    Elbow PROM x30    Isometrics     Retraction 3x10     HEP    Side lying scap retraction/ depression 5\"hx20 R 5/31 with manual assistance to facilitate stretch at cervical muscles   Flexion     Abduction     External Rotation     Internal Rotation     Biceps     Triceps          PRE's     Row prone 3x10  start6/11   Ext prone 3x10 start6/11   External Rotation SL 3x10  start6/11   Internal Rotation     Shrugs 3x10    EXT     Reverse Flys     Serratus     Horizontal Abd with ER     Biceps     Triceps     Retraction          Cable Column/Theraband     External Rotation     Internal Rotation     Shrugs     Lats     Ext     Flex     Scapular Retraction     BIC     TRIC     PNF          Dynamic Stability          Plyoback          Manual interventions     PROM,  10min total               Therapeutic Exercise and NMR EXR  [x] (03862) Provided verbal/tactile cueing for activities related to strengthening, flexibility, endurance, ROM  for improvements in scapular, scapulothoracic and UE control with self care, reaching, carrying, lifting, house/yardwork, driving/computer work.    [] (61568) Provided verbal/tactile cueing for activities related to improving balance, coordination, kinesthetic sense, posture, motor skill, proprioception  to assist with  scapular, scapulothoracic and UE control with self care, reaching, carrying, lifting, house/yardwork, driving/computer work. Therapeutic Activities:    [] (38384 or 31183) Provided verbal/tactile cueing for activities related to improving balance, coordination, kinesthetic sense, posture, motor skill, proprioception and motor activation to allow for proper function of scapular, scapulothoracic and UE control with self care, carrying, lifting, driving/computer work.      Home Exercise Program:    [x] (08960) Reviewed/Progressed HEP activities related to strengthening, flexibility, endurance, ROM of scapular, scapulothoracic and UE control with self care, reaching, carrying, lifting, house/yardwork, driving/computer work  [] (19584) Reviewed/Progressed HEP activities related to improving balance, coordination, allow for proper functional mobility as indicated by patients Functional Deficits. 4. Patient will return to  functional activities without increased symptoms or restriction. Light adls 8-82 weeks heavy adls 16 weeks  PLOF 24 weeks                    Progression Towards Functional goals:  [x] Patient is progressing as expected towards functional goals listed. [] Progression is slowed due to complexities listed. [] Progression has been slowed due to co-morbidities. [] Plan just implemented, too soon to assess goals progression  [] Other:     ASSESSMENT:  See POC 6/6    Treatment/Activity Tolerance:  [x] Patient tolerated treatment  well  6/11 [] Patient limited by fatique  [] Patient limited by pain  [] Patient limited by other medical complications  [x] Other:       Patient education:5/7 Reviewed diagnosis, POC, HEP and its importance. precautions and restrictions reviewed for RCR and bicep tenodesis  5/20 reviewed activity moderation even while in sling  6/4 wear sling 24/7 unless exercising until see MD    Prognosis: [x] Good [] Fair  [] Poor    Patient Requires Follow-up: [x] Yes  [] No    PLAN: 1-2 days per week for 8 Weeks:6/6-8/6      [x] Continue per plan of care [] Alter current plan (see comments)  [] Plan of care initiated [] Hold pending MD visit [] Discharge    Electronically signed by: Que Guaman PT,   *If patient does not return for further follow ups after this date. Please consider this as the patients discharge from physical therapy.

## 2019-06-13 ENCOUNTER — HOSPITAL ENCOUNTER (OUTPATIENT)
Dept: PHYSICAL THERAPY | Age: 69
Setting detail: THERAPIES SERIES
Discharge: HOME | End: 2019-06-13
Payer: MEDICARE

## 2019-06-13 PROCEDURE — 97110 THERAPEUTIC EXERCISES: CPT | Performed by: PHYSICAL THERAPIST

## 2019-06-13 PROCEDURE — 97140 MANUAL THERAPY 1/> REGIONS: CPT | Performed by: PHYSICAL THERAPIST

## 2019-06-13 NOTE — FLOWSHEET NOTE
[facility], [address] [place] [address], [address] [place] [address]  Phone: [phone]   Fax:     [phone]          Physical Therapy Daily Treatment Note  Date:  2019    Patient Name:  Varinder Viera    :  1950  MRN: [id number]  Restrictions/Precautions:    Medical/Treatment Diagnosis Information:  Diagnosis: M75.121 (ICD-10-CM) - Complete tear of right rotator cuff  Treatment Diagnosis: m25.511 m25.611   Procedure performed: 2019  1. Right shoulder diagnostic arthroscopy  2. Right shoulder arthroscopic labral debridement, synovectomy, undersurface rotator cuff debridement in the glenohumeral joint  3. Right shoulder arthroscopic rotator cuff repair using 2 row fixation and one ultra braid's side to side reapproximation of a L-shaped tear  4. Right shoulder arthroscopic subacromial decompression  5. Right shoulder arthroscopic distal clavicle excision  6. Right shoulder arthroscopic rotator cuff augmentation  7.   Right shoulder long head of the biceps tenodesed          Insurance/Certification information:  PT Insurance Information: anthem/40$copay  Physician Information:  Referring Practitioner: Jose Carlos Harper DO  Plan of care signed (Y/N):     Date of Patient follow up with Physician:     Functional disability 59% 2019       Progress Note: []  Yes  []  No  Next due by:  Week of     Latex Allergy:  [x]NO      []YES  Preferred Language for Healthcare:   [x]English       []other:    Visit # Insurance Allowable             eval5/7 unl  40$copay     Pain level:  2-3/10 rest     SUBJECTIVE:   do my ex QOD not daily    OBJECTIVE:   ROM  PROM AROM  Comment     L R L R     Flexion   ~140           Abduction    ~140 scaption         ER   [email or web address]         IR             Other(cervical)             Other                    Strength   L R Comment   Flexion    to 90 for assessment purpose    Abduction    to 45 for assessment purpose 6/6     ER         IR         Supraspinatus         Upper Trap         Lower Trap         Mid Trap         Rhomboids         Biceps         Triceps         Horizontal Abduction         Horizontal Adduction         Lats            Special Tests Results/Comment   Neurologic Signs     Functional Reach        Reflexes/Sensation:               ? Dermatomes/Myotomes intact                             ? Other:     Joint mobility: not assessed due to recent surgery 5/7              ? Normal                       X?Hypo              ? Hyper     Palpation: general TTP  R shoulder , UT and c paraspinals 6/6      Functional Mobility/Transfers: I with protective posture R UE/ultrasling 6/6     Posture: WFL for 76year old female, ultra sling in place 5/7     Bandages/Dressings/Incisions:healed 6/6     Gait: (include devices/WB status): WNL, R UE ultrasling 6/6                          ? Patient history, allergies, meds reviewed. Medical chart reviewed. See intake form. 5/7     Review Of Systems (ROS):  ?Performed Review of systems (Integumentary, CardioPulmonary, Neurological) by intake and observation. Intake form has been scanned into medical record. Patient has been instructed to contact their primary care physician regarding ROS issues if not already being addressed at this time.   5/7            RESTRICTIONS/PRECAUTIONS: RCR/bicep tenodesis protocol    Exercises/Interventions:   Exercises:  Exercise/Equipment Resistance/Repetitions Other comments   Stretching/PROM     Wand 2x10 start6/13   Table Slides 39y1vsd fl  07l8htg abd   start5/10   UE [place] [facility] at 0 04h35ooe Supine 5/28   Supine grab wrist with L hand and raise into flexion    start5/23   Hands on waist  21cqmh4    IR Grab wrist 87ahtp2 start6/13        Pendulum Dump x30 very small movements    Elbow PROM     Isometrics     Retraction 3x10     HEP    Side lying scap retraction/ depression 5\"hx20 R 5/31 with manual assistance to facilitate stretch at cervical muscles   Flexion     Abduction     External Rotation     Internal Rotation     Biceps     Triceps          PRE's     Row prone 3x10  start6/11   Ext prone 3x10 start6/11   External Rotation SL 3x10  start6/11   Internal Rotation     Shrugs 3x10    EXT     Reverse Flys     Serratus     Horizontal Abd with ER     Biceps 3x10 1# start6/13   Triceps     Retraction          Cable Column/Theraband     External Rotation     Internal Rotation     Shrugs     Lats     Ext     Flex     Scapular Retraction     BIC     TRIC     PNF          Dynamic Stability          Plyoback          Manual interventions     PROM, IASTM UT 15min total 6/13              Therapeutic Exercise and NMR EXR  [x] (88516) Provided verbal/tactile cueing for activities related to strengthening, flexibility, endurance, ROM  for improvements in scapular, scapulothoracic and UE control with self care, reaching, carrying, lifting, house/yardwork, driving/computer work.    [] (44089) Provided verbal/tactile cueing for activities related to improving balance, coordination, kinesthetic sense, posture, motor skill, proprioception  to assist with  scapular, scapulothoracic and UE control with self care, reaching, carrying, lifting, house/yardwork, driving/computer work. Therapeutic Activities:    [] (21134 or 97733) Provided verbal/tactile cueing for activities related to improving balance, coordination, kinesthetic sense, posture, motor skill, proprioception and motor activation to allow for proper function of scapular, scapulothoracic and UE control with self care, carrying, lifting, driving/computer work.      Home Exercise Program:    [x] (16098) Reviewed/Progressed HEP activities related to strengthening, flexibility, endurance, ROM of scapular, scapulothoracic and UE control with self care, reaching, carrying, lifting, house/yardwork, driving/computer work  [] (17424) Reviewed/Progressed HEP activities related to improving balance, coordination, kinesthetic sense, posture, motor skill, proprioception of scapular, scapulothoracic and UE control with self care, reaching, carrying, lifting, house/yardwork, driving/computer work      Manual Treatments:  PROM / STM / Oscillations-Mobs:  G-I, II, III, IV (PA's, Inf., Post.)  [x] (82508) Provided manual therapy to mobilize soft tissue/joints of cervical/CT, scapular GHJ and UE for the purpose of modulating pain, promoting relaxation,  increasing ROM, reducing/eliminating soft tissue swelling/inflammation/restriction, improving soft tissue extensibility and allowing for proper ROM for normal function with self care, reaching, carrying, lifting, house/yardwork, driving/computer work    Modalities:  Ice 12 6/13  Charges:  Timed Code Treatment Minutes: 40   Total Treatment Minutes: 3195-1546       [] EVAL (LOW) 91738 (typically 20 minutes face-to-face)  [] EVAL (MOD) 14173 (typically 30 minutes face-to-face)  [] EVAL (HIGH) 62608 (typically 45 minutes face-to-face)  [] RE-EVAL     [x] HW(45673) x  2   [] IONTO  [] NMR (09899) x      [] VASO  [x] Manual (46364) x  1    [] Other:  [] TA x       [] Mech Traction (03116)  [] ES(attended) (14728)      [] ES (un) (76442):     GOALS  Patient stated goal: return toPLOF     Therapist goals for Patient:   Short Term Goals: To be achieved in: 2-4 weeks  1. Independent in HEP and progression per patient tolerance, in order to prevent re-injury. MET  2. Patient will have a decrease in pain to facilitate improvement in movement, function, and ADLs as indicated by Functional Deficits. PARTIALLY MET     Long Term Goals: To be achieved in: 16-24 weeks  1. Disability index score of 10% or less for the UEFS to assist with reaching prior level of function. 2. Patient will demonstrate increased AROM to *WFLto allow for proper joint functioning as indicated by patients Functional Deficits.    3. Patient will demonstrate an increase in Strength to good scapular and core control  for UE to allow for proper functional mobility as indicated by patients Functional Deficits. 4. Patient will return to  functional activities without increased symptoms or restriction. Light adls 7-08 weeks heavy adls 16 weeks  PLOF 24 weeks                    Progression Towards Functional goals:  [x] Patient is progressing as expected towards functional goals listed. [] Progression is slowed due to complexities listed. [] Progression has been slowed due to co-morbidities. [] Plan just implemented, too soon to assess goals progression  [] Other:     ASSESSMENT:  See POC 6/6    Treatment/Activity Tolerance:  [x] Patient tolerated treatment  well  6/13 [] Patient limited by fatique  [] Patient limited by pain  [] Patient limited by other medical complications  [x] Other:       Patient education:5/7 Reviewed diagnosis, POC, HEP and its importance. precautions and restrictions reviewed for RCR and bicep tenodesis  5/20 reviewed activity moderation even while in sling  6/4 wear sling 24/7 unless exercising until see MD  6/13 perform HEP daily     Prognosis: [x] Good [] Fair  [] Poor    Patient Requires Follow-up: [x] Yes  [] No    PLAN: 1-2 days per week for 8 Weeks:6/6-8/6      [x] Continue per plan of care [] Alter current plan (see comments)  [] Plan of care initiated [] Hold pending MD visit [] Discharge    Electronically signed by: Manuel Cervantes PT,   *If patient does not return for further follow ups after this date. Please consider this as the patients discharge from physical therapy.

## 2019-06-18 ENCOUNTER — HOSPITAL ENCOUNTER (OUTPATIENT)
Dept: PHYSICAL THERAPY | Age: 69
Setting detail: THERAPIES SERIES
Discharge: HOME | End: 2019-06-18
Payer: MEDICARE

## 2019-06-18 PROCEDURE — 97110 THERAPEUTIC EXERCISES: CPT | Performed by: PHYSICAL THERAPIST

## 2019-06-18 PROCEDURE — 97140 MANUAL THERAPY 1/> REGIONS: CPT | Performed by: PHYSICAL THERAPIST

## 2019-06-18 NOTE — FLOWSHEET NOTE
The [address] [place], [place] [address], [address], [address] [place] [address]  Phone: [phone]   Fax:     [phone]          Physical Therapy Daily Treatment Note  Date:  2019    Patient Name:  Ajith Moreira    :  1950  MRN: [id number]  Restrictions/Precautions:    Medical/Treatment Diagnosis Information:  Diagnosis: M75.121 (ICD-10-CM) - Complete tear of right rotator cuff  Treatment Diagnosis: m25.511 m25.611   Procedure performed: 2019  1. Right shoulder diagnostic arthroscopy  2. Right shoulder arthroscopic labral debridement, synovectomy, undersurface rotator cuff debridement in the glenohumeral joint  3. Right shoulder arthroscopic rotator cuff repair using 2 row fixation and one ultra braid's side to side reapproximation of a L-shaped tear  4. Right shoulder arthroscopic subacromial decompression  5. Right shoulder arthroscopic distal clavicle excision  6. Right shoulder arthroscopic rotator cuff augmentation  7. Right shoulder long head of the biceps tenodesed          Insurance/Certification information:  PT Insurance Information: anthem/40$copay  Physician Information:  Referring Practitioner: Surya Arreola DO  Plan of care signed (Y/N):     Date of Patient follow up with Physician:     Functional disability 59% 2019       Progress Note: []  Yes  []  No  Next due by:  Week of     Latex Allergy:  [x]NO      []YES  Preferred Language for Healthcare:   [x]English       []other:    Visit # Insurance Allowable   12            eval5/7 unl  40$copay     Pain level:  2-3/10 rest     SUBJECTIVE:   doing a bit better daily.    Pain is more in upper arm now    OBJECTIVE:   ROM  PROM AROM  Comment     L R L R     Flexion   ~140           Abduction    ~140 scaption         LAYO [email or web address]         IR             Other(cervical)             Other                    Strength   L R Comment   Flexion    to 90 for depression  5/31 with manual assistance to facilitate stretch at cervical muscles   Flexion     Abduction     External Rotation     Internal Rotation     Biceps     Triceps          PRE's     Row prone 3x10 1# ^6/18   Ext prone 3x10 1# ^6/18   External Rotation SL 3x10  start6/11   Internal Rotation     Shrugs 3x10    EXT     Reverse Flys     Serratus     Horizontal Abd with ER     Biceps 3x10 1# start6/13   Triceps     Retraction          Cable Column/Theraband     External Rotation     Internal Rotation     Shrugs     Lats     Ext     Flex     Scapular Retraction     BIC     TRIC Next visit    PNF          Dynamic Stability          Plyoback          Manual interventions     PROM, IASTM UT 15min total 6/18              Therapeutic Exercise and NMR EXR  [x] (15264) Provided verbal/tactile cueing for activities related to strengthening, flexibility, endurance, ROM  for improvements in scapular, scapulothoracic and UE control with self care, reaching, carrying, lifting, house/yardwork, driving/computer work.    [] (18550) Provided verbal/tactile cueing for activities related to improving balance, coordination, kinesthetic sense, posture, motor skill, proprioception  to assist with  scapular, scapulothoracic and UE control with self care, reaching, carrying, lifting, house/yardwork, driving/computer work. Therapeutic Activities:    [] (91796 or 40536) Provided verbal/tactile cueing for activities related to improving balance, coordination, kinesthetic sense, posture, motor skill, proprioception and motor activation to allow for proper function of scapular, scapulothoracic and UE control with self care, carrying, lifting, driving/computer work.      Home Exercise Program:    [x] (61451) Reviewed/Progressed HEP activities related to strengthening, flexibility, endurance, ROM of scapular, scapulothoracic and UE control with self care, reaching, carrying, lifting, house/yardwork, driving/computer work  [] (66317) Reviewed/Progressed HEP activities related to improving balance, coordination, kinesthetic sense, posture, motor skill, proprioception of scapular, scapulothoracic and UE control with self care, reaching, carrying, lifting, house/yardwork, driving/computer work      Manual Treatments:  PROM / STM / Oscillations-Mobs:  G-I, II, III, IV (PA's, Inf., Post.)  [x] (30686) Provided manual therapy to mobilize soft tissue/joints of cervical/CT, scapular GHJ and UE for the purpose of modulating pain, promoting relaxation,  increasing ROM, reducing/eliminating soft tissue swelling/inflammation/restriction, improving soft tissue extensibility and allowing for proper ROM for normal function with self care, reaching, carrying, lifting, house/yardwork, driving/computer work    Modalities:  Ice 12 6/18  Charges:  Timed Code Treatment Minutes: 40   Total Treatment Minutes: 930-1030       [] EVAL (LOW) 61026 (typically 20 minutes face-to-face)  [] EVAL (MOD) 34732 (typically 30 minutes face-to-face)  [] EVAL (HIGH) 97884 (typically 45 minutes face-to-face)  [] RE-EVAL     [x] HO(26171) x  2   [] IONTO  [] NMR (16436) x      [] VASO  [x] Manual (39135) x  1    [] Other:  [] TA x       [] Mech Traction (65825)  [] ES(attended) (14145)      [] ES (un) (16758):     GOALS  Patient stated goal: return toPLOF     Therapist goals for Patient:   Short Term Goals: To be achieved in: 2-4 weeks  1. Independent in HEP and progression per patient tolerance, in order to prevent re-injury. MET  2. Patient will have a decrease in pain to facilitate improvement in movement, function, and ADLs as indicated by Functional Deficits. PARTIALLY MET     Long Term Goals: To be achieved in: 16-24 weeks  1. Disability index score of 10% or less for the UEFS to assist with reaching prior level of function. 2. Patient will demonstrate increased AROM to *WFLto allow for proper joint functioning as indicated by patients Functional Deficits.    3. Patient will demonstrate an increase in Strength to good scapular and core control  for UE to allow for proper functional mobility as indicated by patients Functional Deficits. 4. Patient will return to  functional activities without increased symptoms or restriction. Light adls 3-31 weeks heavy adls 16 weeks  PLOF 24 weeks                    Progression Towards Functional goals:  [x] Patient is progressing as expected towards functional goals listed. [] Progression is slowed due to complexities listed. [] Progression has been slowed due to co-morbidities. [] Plan just implemented, too soon to assess goals progression  [] Other:     ASSESSMENT:  See POC 6/6    Treatment/Activity Tolerance:  [x] Patient tolerated treatment  well  6/18 [] Patient limited by fatique  [] Patient limited by pain  [] Patient limited by other medical complications  [x] Other:       Patient education:5/7 Reviewed diagnosis, POC, HEP and its importance. precautions and restrictions reviewed for RCR and bicep tenodesis  5/20 reviewed activity moderation even while in sling  6/4 wear sling 24/7 unless exercising until see MD  6/13 perform HEP daily     Prognosis: [x] Good [] Fair  [] Poor    Patient Requires Follow-up: [x] Yes  [] No    PLAN: 1-2 days per week for 8 Weeks:6/6-8/6      [x] Continue per plan of care [] Alter current plan (see comments)  [] Plan of care initiated [] Hold pending MD visit [] Discharge    Electronically signed by: Nancy Raymond PT,   *If patient does not return for further follow ups after this date. Please consider this as the patients discharge from physical therapy.

## 2019-06-20 ENCOUNTER — HOSPITAL ENCOUNTER (OUTPATIENT)
Dept: PHYSICAL THERAPY | Age: 69
Setting detail: THERAPIES SERIES
Discharge: HOME | End: 2019-06-20
Payer: MEDICARE

## 2019-06-20 ENCOUNTER — APPOINTMENT (OUTPATIENT)
Dept: PHYSICAL THERAPY | Age: 69
End: 2019-06-20
Payer: MEDICARE

## 2019-06-20 PROCEDURE — 97110 THERAPEUTIC EXERCISES: CPT | Performed by: PHYSICAL THERAPIST

## 2019-06-20 PROCEDURE — 97140 MANUAL THERAPY 1/> REGIONS: CPT | Performed by: PHYSICAL THERAPIST

## 2019-06-20 NOTE — FLOWSHEET NOTE
5/31 with manual assistance to facilitate stretch at cervical muscles   Flexion     Abduction     External Rotation     Internal Rotation     Biceps     Triceps          PRE's     Row prone 3x10 1# ^6/18   Ext prone 3x10 1# ^6/18   External Rotation SL 3x10  start6/11   Internal Rotation     Shrugs 3x10    EXT     Reverse Flys     Serratus     Horizontal Abd with ER     Biceps 3x10 2# ^6/20   Triceps     Retraction          Cable Column/Theraband     External Rotation     Internal Rotation     Shrugs     Lats     Ext     Flex     Scapular Retraction     BIC     TRIC 3x10 green start6/20   PNF          Dynamic Stability          Plyoback          Manual interventions     PROM,  10min total 6/20              Therapeutic Exercise and NMR EXR  [x] (13352) Provided verbal/tactile cueing for activities related to strengthening, flexibility, endurance, ROM  for improvements in scapular, scapulothoracic and UE control with self care, reaching, carrying, lifting, house/yardwork, driving/computer work.    [] (38131) Provided verbal/tactile cueing for activities related to improving balance, coordination, kinesthetic sense, posture, motor skill, proprioception  to assist with  scapular, scapulothoracic and UE control with self care, reaching, carrying, lifting, house/yardwork, driving/computer work. Therapeutic Activities:    [] (88705 or 92894) Provided verbal/tactile cueing for activities related to improving balance, coordination, kinesthetic sense, posture, motor skill, proprioception and motor activation to allow for proper function of scapular, scapulothoracic and UE control with self care, carrying, lifting, driving/computer work.      Home Exercise Program:    [x] (27674) Reviewed/Progressed HEP activities related to strengthening, flexibility, endurance, ROM of scapular, scapulothoracic and UE control with self care, reaching, carrying, lifting, house/yardwork, driving/computer work  [] (47324) Reviewed/Progressed HEP activities related to improving balance, coordination, kinesthetic sense, posture, motor skill, proprioception of scapular, scapulothoracic and UE control with self care, reaching, carrying, lifting, house/yardwork, driving/computer work      Manual Treatments:  PROM / STM / Oscillations-Mobs:  G-I, II, III, IV (PA's, Inf., Post.)  [x] (84174) Provided manual therapy to mobilize soft tissue/joints of cervical/CT, scapular GHJ and UE for the purpose of modulating pain, promoting relaxation,  increasing ROM, reducing/eliminating soft tissue swelling/inflammation/restriction, improving soft tissue extensibility and allowing for proper ROM for normal function with self care, reaching, carrying, lifting, house/yardwork, driving/computer work    Modalities:  Ice 12 6/18  Charges:  Timed Code Treatment Minutes: 40   Total Treatment Minutes: 200-305       [] EVAL (LOW) 60739 (typically 20 minutes face-to-face)  [] EVAL (MOD) 77421 (typically 30 minutes face-to-face)  [] EVAL (HIGH) 84723 (typically 45 minutes face-to-face)  [] RE-EVAL     [x] QO(93234) x  2   [] IONTO  [] NMR (38734) x      [] VASO  [x] Manual (34171) x  1    [] Other:  [] TA x       [] Mech Traction (76900)  [] ES(attended) (63144)      [] ES (un) (61339):     GOALS  Patient stated goal: return toPLOF     Therapist goals for Patient:   Short Term Goals: To be achieved in: 2-4 weeks  1. Independent in HEP and progression per patient tolerance, in order to prevent re-injury. MET  2. Patient will have a decrease in pain to facilitate improvement in movement, function, and ADLs as indicated by Functional Deficits. PARTIALLY MET     Long Term Goals: To be achieved in: 16-24 weeks  1. Disability index score of 10% or less for the UEFS to assist with reaching prior level of function. 2. Patient will demonstrate increased AROM to *WFLto allow for proper joint functioning as indicated by patients Functional Deficits.    3. Patient will demonstrate an increase

## 2019-06-24 ENCOUNTER — HOSPITAL ENCOUNTER (OUTPATIENT)
Dept: PHYSICAL THERAPY | Age: 69
Setting detail: THERAPIES SERIES
Discharge: HOME | End: 2019-06-24
Payer: MEDICARE

## 2019-06-24 PROCEDURE — 97110 THERAPEUTIC EXERCISES: CPT | Performed by: PHYSICAL THERAPIST

## 2019-06-24 PROCEDURE — 97140 MANUAL THERAPY 1/> REGIONS: CPT | Performed by: PHYSICAL THERAPIST

## 2019-06-24 NOTE — FLOWSHEET NOTE
[facility], [address] [place] [address], [address] [place] [address]  Phone: [phone]   Fax:     [phone]          Physical Therapy Daily Treatment Note  Date:  2019    Patient Name:  Mireille Cordon    :  1950  MRN: [id number]  Restrictions/Precautions:    Medical/Treatment Diagnosis Information:  Diagnosis: M75.121 (ICD-10-CM) - Complete tear of right rotator cuff  Treatment Diagnosis: m25.511 m25.611   Procedure performed: 2019  1. Right shoulder diagnostic arthroscopy  2. Right shoulder arthroscopic labral debridement, synovectomy, undersurface rotator cuff debridement in the glenohumeral joint  3. Right shoulder arthroscopic rotator cuff repair using 2 row fixation and one ultra braid's side to side reapproximation of a L-shaped tear  4. Right shoulder arthroscopic subacromial decompression  5. Right shoulder arthroscopic distal clavicle excision  6. Right shoulder arthroscopic rotator cuff augmentation  7.   Right shoulder long head of the biceps tenodesed          Insurance/Certification information:  PT Insurance Information: anthem/40$copay  Physician Information:  Referring Practitioner: Pam Enrique DO  Plan of care signed (Y/N):     Date of Patient follow up with Physician:     Functional disability 59% 2019       Progress Note: []  Yes  []  No  Next due by:  Week of     Latex Allergy:  [x]NO      []YES  Preferred Language for Healthcare:   [x]English       []other:    Visit # Insurance Allowable   14            eval5/7 unl  40$copay     Pain level:  2-3/10 rest     SUBJECTIVE:   shoulder aches, sleeping better, reaching behind back better    OBJECTIVE:   ROM  PROM AROM  Comment     L R L R     Flexion   ~140           Abduction    ~140 scaption         ER   [email or web address]         IR             Other(cervical)             Other                    Strength   L R Comment   Flexion    to 90 for assessment purpose 6/6     Abduction    to 45 for assessment purpose 6/6     ER         IR         Supraspinatus         Upper Trap         Lower Trap         Mid Trap         Rhomboids         Biceps         Triceps         Horizontal Abduction         Horizontal Adduction         Lats            Special Tests Results/Comment   Neurologic Signs     Functional Reach        Reflexes/Sensation:               ? Dermatomes/Myotomes intact                             ? Other:     Joint mobility: not assessed due to recent surgery 5/7              ? Normal                       X?Hypo              ? Hyper     Palpation: general TTP  R shoulder , UT and c paraspinals 6/6      Functional Mobility/Transfers: I with protective posture R UE/ultrasling 6/6     Posture: WFL for 76year old female, ultra sling in place 5/7     Bandages/Dressings/Incisions:healed 6/6     Gait: (include devices/WB status): WNL, R UE ultrasling 6/6                          ? Patient history, allergies, meds reviewed. Medical chart reviewed. See intake form. 5/7     Review Of Systems (ROS):  ?Performed Review of systems (Integumentary, CardioPulmonary, Neurological) by intake and observation. Intake form has been scanned into medical record. Patient has been instructed to contact their primary care physician regarding ROS issues if not already being addressed at this time.   5/7            RESTRICTIONS/PRECAUTIONS: RCR/bicep tenodesis protocol    Exercises/Interventions:   Exercises:  Exercise/Equipment Resistance/Repetitions Other comments   Stretching/PROM     pulleys Fl [id number] start6/20   Wand 2x10 start6/13   Table Slides [id number] fl  38d1dup abd   start5/10   UE [place] ER at 0 [id number] Supine 5/28   Supine grab wrist with L hand and raise into flexion    start5/23   Hands on waist  61sfrg2    IR Grab wrist 20brzf3 start6/13        Pendulum circles x30 very small movements    Elbow PROM     Isometrics     Retraction 3x10     HEP    Side lying scap retraction/ depression  5/31 with manual assistance to facilitate stretch at cervical muscles   Flexion     Abduction     External Rotation     Internal Rotation     Biceps     Triceps          PRE's     Row prone 3x10 2# ^6/24   Ext prone 3x10 2# ^6/24   External Rotation SL 3x10  start6/11   Internal Rotation     Shrugs 3x10    EXT     Reverse Flys     Serratus     Horizontal Abd with ER     Biceps 3x10 2# ^6/20   Triceps     Retraction          Cable Column/Theraband     External Rotation     Internal Rotation     Shrugs     Lats     Ext     Flex     Scapular Retraction     BIC     TRIC 3x10 green start6/20   PNF          Dynamic Stability          Plyoback          Manual interventions     PROM,  10min total 6/24              Therapeutic Exercise and NMR EXR  [x] (99662) Provided verbal/tactile cueing for activities related to strengthening, flexibility, endurance, ROM  for improvements in scapular, scapulothoracic and UE control with self care, reaching, carrying, lifting, house/yardwork, driving/computer work.    [] (54600) Provided verbal/tactile cueing for activities related to improving balance, coordination, kinesthetic sense, posture, motor skill, proprioception  to assist with  scapular, scapulothoracic and UE control with self care, reaching, carrying, lifting, house/yardwork, driving/computer work. Therapeutic Activities:    [] (57311 or 45467) Provided verbal/tactile cueing for activities related to improving balance, coordination, kinesthetic sense, posture, motor skill, proprioception and motor activation to allow for proper function of scapular, scapulothoracic and UE control with self care, carrying, lifting, driving/computer work.      Home Exercise Program:    [x] (60085) Reviewed/Progressed HEP activities related to strengthening, flexibility, endurance, ROM of scapular, scapulothoracic and UE control with self care, reaching, carrying, lifting, house/yardwork, driving/computer work  [] (54062) Reviewed/Progressed HEP activities related to improving balance, coordination, kinesthetic sense, posture, motor skill, proprioception of scapular, scapulothoracic and UE control with self care, reaching, carrying, lifting, house/yardwork, driving/computer work      Manual Treatments:  PROM / STM / Oscillations-Mobs:  G-I, II, III, IV (PA's, Inf., Post.)  [x] (83956) Provided manual therapy to mobilize soft tissue/joints of cervical/CT, scapular GHJ and UE for the purpose of modulating pain, promoting relaxation,  increasing ROM, reducing/eliminating soft tissue swelling/inflammation/restriction, improving soft tissue extensibility and allowing for proper ROM for normal function with self care, reaching, carrying, lifting, house/yardwork, driving/computer work    Modalities:   Charges:  Timed Code Treatment Minutes: 40   Total Treatment Minutes: 855-955       [] EVAL (LOW) 60247 (typically 20 minutes face-to-face)  [] EVAL (MOD) 18188 (typically 30 minutes face-to-face)  [] EVAL (HIGH) 89211 (typically 45 minutes face-to-face)  [] RE-EVAL     [x] EG(85709) x  2   [] IONTO  [] NMR (62386) x      [] VASO  [x] Manual (58997) x  1    [] Other:  [] TA x       [] Mech Traction (15644)  [] ES(attended) (64769)      [] ES (un) (34134):     GOALS  Patient stated goal: return toPLOF     Therapist goals for Patient:   Short Term Goals: To be achieved in: 2-4 weeks  1. Independent in HEP and progression per patient tolerance, in order to prevent re-injury. MET  2. Patient will have a decrease in pain to facilitate improvement in movement, function, and ADLs as indicated by Functional Deficits. PARTIALLY MET     Long Term Goals: To be achieved in: 16-24 weeks  1. Disability index score of 10% or less for the UEFS to assist with reaching prior level of function. 2. Patient will demonstrate increased AROM to *WFLto allow for proper joint functioning as indicated by patients Functional Deficits.    3. Patient will demonstrate an increase in Strength to good scapular and core control  for UE to allow for proper functional mobility as indicated by patients Functional Deficits. 4. Patient will return to  functional activities without increased symptoms or restriction. Light adls 4-52 weeks heavy adls 16 weeks  PLOF 24 weeks                    Progression Towards Functional goals:  [x] Patient is progressing as expected towards functional goals listed. [] Progression is slowed due to complexities listed. [] Progression has been slowed due to co-morbidities. [] Plan just implemented, too soon to assess goals progression  [] Other:     ASSESSMENT:  See POC 6/6    Treatment/Activity Tolerance:  [x] Patient tolerated treatment  well  6/24 [] Patient limited by fatique  [] Patient limited by pain  [] Patient limited by other medical complications  [x] Other:       Patient education:5/7 Reviewed diagnosis, POC, HEP and its importance. precautions and restrictions reviewed for RCR and bicep tenodesis  5/20 reviewed activity moderation even while in sling  6/4 wear sling 24/7 unless exercising until see MD  6/13 perform HEP daily     Prognosis: [x] Good [] Fair  [] Poor    Patient Requires Follow-up: [x] Yes  [] No    PLAN: 1-2 days per week for 8 Weeks:6/6-8/6      [x] Continue per plan of care [] Alter current plan (see comments)  [] Plan of care initiated [] Hold pending MD visit [] Discharge    Electronically signed by: Tracie Litten PT,   *If patient does not return for further follow ups after this date. Please consider this as the patients discharge from physical therapy.

## 2019-07-08 ENCOUNTER — HOSPITAL ENCOUNTER (OUTPATIENT)
Dept: PHYSICAL THERAPY | Age: 69
Setting detail: THERAPIES SERIES
Discharge: HOME | End: 2019-07-08
Payer: MEDICARE

## 2019-07-08 PROCEDURE — 97140 MANUAL THERAPY 1/> REGIONS: CPT | Performed by: PHYSICAL THERAPIST

## 2019-07-08 PROCEDURE — 97110 THERAPEUTIC EXERCISES: CPT | Performed by: PHYSICAL THERAPIST

## 2019-07-08 NOTE — FLOWSHEET NOTE
45 for assessment purpose 6/6     ER         IR         Supraspinatus         Upper Trap         Lower Trap         Mid Trap         Rhomboids         Biceps         Triceps         Horizontal Abduction         Horizontal Adduction         Lats            Special Tests Results/Comment   Neurologic Signs     Functional Reach        Reflexes/Sensation:               ? Dermatomes/Myotomes intact                             ? Other:     Joint mobility: not assessed due to recent surgery 5/7              ? Normal                       X?Hypo              ? Hyper     Palpation: general TTP  R shoulder , UT and c paraspinals 6/6      Functional Mobility/Transfers: I with protective posture R UE/ultrasling 6/6     Posture: WFL for 76year old female, ultra sling in place 5/7     Bandages/Dressings/Incisions:healed 6/6     Gait: (include devices/WB status): WNL, R UE ultrasling 6/6                          ? Patient history, allergies, meds reviewed. Medical chart reviewed. See intake form. 5/7     Review Of Systems (ROS):  ?Performed Review of systems (Integumentary, CardioPulmonary, Neurological) by intake and observation. Intake form has been scanned into medical record. Patient has been instructed to contact their primary care physician regarding ROS issues if not already being addressed at this time.   5/7            RESTRICTIONS/PRECAUTIONS: RCR/bicep tenodesis protocol    Exercises/Interventions:   Exercises:  Exercise/Equipment Resistance/Repetitions Other comments   Stretching/PROM     pulleys Fl 63o22joz start6/20   Wand 2x10 start6/13   Table Slides 55o3jng fl  79o3lqx abd   start5/10   UE [place] ER at 0 73b13ezq Supine 5/28   Supine grab wrist with L hand and raise into flexion    start5/23   Hands on waist  70qtue2    IR Grab wrist 21tfim9 start6/13        Pendulum circles x30 very small movements    Elbow PROM     Isometrics     Retraction 3x10     HEP    Side lying scap retraction/ depression  5/31 with manual assistance to facilitate stretch at cervical muscles   Flexion     Abduction     External Rotation     Internal Rotation     Biceps     Triceps          PRE's     Row prone 3x10 2# ^6/24   Ext prone 3x10 2# ^6/24   External Rotation SL 3x10 2# ^7/8   Internal Rotation     Shrugs 3x10    EXT     Reverse Flys     Serratus     Horizontal Abd with ER     Biceps 3x10 2# ^6/20   Triceps     Retraction          Cable Column/Theraband     External Rotation     Internal Rotation     Shrugs     Lats     Ext     Flex     Scapular Retraction     BIC     TRIC 3x10 green start6/20   PNF          Dynamic Stability          [employer]lyoback          Manual interventions     PROM,  10min total 7/8              Therapeutic Exercise and NMR EXR  [x] (51281) Provided verbal/tactile cueing for activities related to strengthening, flexibility, endurance, ROM  for improvements in scapular, scapulothoracic and UE control with self care, reaching, carrying, lifting, house/yardwork, driving/computer work.    [] (19325) Provided verbal/tactile cueing for activities related to improving balance, coordination, kinesthetic sense, posture, motor skill, proprioception  to assist with  scapular, scapulothoracic and UE control with self care, reaching, carrying, lifting, house/yardwork, driving/computer work. Therapeutic Activities:    [] (93996 or 50107) Provided verbal/tactile cueing for activities related to improving balance, coordination, kinesthetic sense, posture, motor skill, proprioception and motor activation to allow for proper function of scapular, scapulothoracic and UE control with self care, carrying, lifting, driving/computer work.      Home Exercise Program:    [x] (40042) Reviewed/Progressed HEP activities related to strengthening, flexibility, endurance, ROM of scapular, scapulothoracic and UE control with self care, reaching, carrying, lifting, house/yardwork, driving/computer work  [] (82787) Reviewed/Progressed HEP activities related

## 2019-07-09 ASSESSMENT — ENCOUNTER SYMPTOMS
CHEST TIGHTNESS: 0
SHORTNESS OF BREATH: 0

## 2019-07-10 ENCOUNTER — OFFICE VISIT (OUTPATIENT)
Dept: INTERNAL MEDICINE CLINIC | Age: 69
End: 2019-07-10
Payer: MEDICARE

## 2019-07-10 VITALS
BODY MASS INDEX: 27.28 KG/M2 | OXYGEN SATURATION: 98 % | WEIGHT: 159 LBS | SYSTOLIC BLOOD PRESSURE: 118 MMHG | HEART RATE: 78 BPM | DIASTOLIC BLOOD PRESSURE: 74 MMHG

## 2019-07-10 DIAGNOSIS — E11.9 TYPE 2 DIABETES MELLITUS WITHOUT COMPLICATION, WITHOUT LONG-TERM CURRENT USE OF INSULIN (HCC): Primary | ICD-10-CM

## 2019-07-10 DIAGNOSIS — G47.00 INSOMNIA, UNSPECIFIED TYPE: ICD-10-CM

## 2019-07-10 DIAGNOSIS — K21.9 GASTROESOPHAGEAL REFLUX DISEASE WITHOUT ESOPHAGITIS: ICD-10-CM

## 2019-07-10 PROBLEM — M75.121 COMPLETE TEAR OF RIGHT ROTATOR CUFF: Status: RESOLVED | Noted: 2018-04-26 | Resolved: 2019-07-10

## 2019-07-10 LAB — HBA1C MFR BLD: 6.4 %

## 2019-07-10 PROCEDURE — 83036 HEMOGLOBIN GLYCOSYLATED A1C: CPT | Performed by: INTERNAL MEDICINE

## 2019-07-10 PROCEDURE — 99214 OFFICE O/P EST MOD 30 MIN: CPT | Performed by: INTERNAL MEDICINE

## 2019-07-10 RX ORDER — PRAVASTATIN SODIUM 40 MG
40 TABLET ORAL DAILY
Qty: 90 TABLET | Refills: 0 | Status: SHIPPED | OUTPATIENT
Start: 2019-07-10

## 2019-07-10 RX ORDER — TRAZODONE HYDROCHLORIDE 50 MG/1
TABLET ORAL
Qty: 180 TABLET | Refills: 0 | Status: SHIPPED | OUTPATIENT
Start: 2019-07-10

## 2019-07-11 ENCOUNTER — HOSPITAL ENCOUNTER (OUTPATIENT)
Dept: PHYSICAL THERAPY | Age: 69
Setting detail: THERAPIES SERIES
Discharge: HOME | End: 2019-07-11
Payer: MEDICARE

## 2019-07-11 PROCEDURE — 97110 THERAPEUTIC EXERCISES: CPT | Performed by: PHYSICAL THERAPIST

## 2019-07-11 PROCEDURE — 97140 MANUAL THERAPY 1/> REGIONS: CPT | Performed by: PHYSICAL THERAPIST

## 2019-07-11 NOTE — FLOWSHEET NOTE
retraction/ depression  5/31 with manual assistance to facilitate stretch at cervical muscles   Flexion     Abduction     External Rotation     Internal Rotation     Biceps     Triceps          PRE's     Row prone 3x10 2# ^6/24   Ext prone 3x10 2# ^6/24   External Rotation SL 3x10 2# ^7/8   Internal Rotation     Shrugs 3x10    EXT     Reverse Flys     Serratus     Horizontal Abd with ER     Biceps 3x10 2# ^6/20   Triceps     Retraction          Cable Column/Theraband     External Rotation     Internal Rotation     Shrugs     Lats     Ext     Flex     Scapular Retraction     BIC     TRIC 3x10 blue ^7/11   PNF          Dynamic Stability          Plyoback          Manual interventions     PROM,  10min total 7/11              Therapeutic Exercise and NMR EXR  [x] (74397) Provided verbal/tactile cueing for activities related to strengthening, flexibility, endurance, ROM  for improvements in scapular, scapulothoracic and UE control with self care, reaching, carrying, lifting, house/yardwork, driving/computer work.    [] (78124) Provided verbal/tactile cueing for activities related to improving balance, coordination, kinesthetic sense, posture, motor skill, proprioception  to assist with  scapular, scapulothoracic and UE control with self care, reaching, carrying, lifting, house/yardwork, driving/computer work. Therapeutic Activities:    [] (89958 or 29407) Provided verbal/tactile cueing for activities related to improving balance, coordination, kinesthetic sense, posture, motor skill, proprioception and motor activation to allow for proper function of scapular, scapulothoracic and UE control with self care, carrying, lifting, driving/computer work.      Home Exercise Program:    [x] (18808) Reviewed/Progressed HEP activities related to strengthening, flexibility, endurance, ROM of scapular, scapulothoracic and UE control with self care, reaching, carrying, lifting, house/yardwork, driving/computer work  [] (21617)

## 2019-07-15 ENCOUNTER — OFFICE VISIT (OUTPATIENT)
Dept: ORTHOPEDIC SURGERY | Age: 69
End: 2019-07-15

## 2019-07-15 VITALS — WEIGHT: 158.95 LBS | BODY MASS INDEX: 27.14 KG/M2 | HEIGHT: 64 IN

## 2019-07-15 DIAGNOSIS — M75.121 NONTRAUMATIC COMPLETE TEAR OF RIGHT ROTATOR CUFF: Primary | ICD-10-CM

## 2019-07-15 DIAGNOSIS — S43.432D SUPERIOR GLENOID LABRUM LESION OF LEFT SHOULDER, SUBSEQUENT ENCOUNTER: ICD-10-CM

## 2019-07-15 PROCEDURE — 99024 POSTOP FOLLOW-UP VISIT: CPT | Performed by: ORTHOPAEDIC SURGERY

## 2019-07-16 ENCOUNTER — HOSPITAL ENCOUNTER (OUTPATIENT)
Dept: PHYSICAL THERAPY | Age: 69
Setting detail: THERAPIES SERIES
Discharge: HOME | End: 2019-07-16
Payer: MEDICARE

## 2019-07-23 ENCOUNTER — HOSPITAL ENCOUNTER (OUTPATIENT)
Dept: PHYSICAL THERAPY | Age: 69
Setting detail: THERAPIES SERIES
Discharge: HOME | End: 2019-07-23
Payer: MEDICARE

## 2019-07-25 ENCOUNTER — HOSPITAL ENCOUNTER (OUTPATIENT)
Dept: PHYSICAL THERAPY | Age: 69
Setting detail: THERAPIES SERIES
Discharge: HOME | End: 2019-07-25
Payer: MEDICARE

## 2019-07-25 PROCEDURE — 97110 THERAPEUTIC EXERCISES: CPT | Performed by: PHYSICAL THERAPIST

## 2019-07-25 PROCEDURE — 97140 MANUAL THERAPY 1/> REGIONS: CPT | Performed by: PHYSICAL THERAPIST

## 2019-07-25 NOTE — DISCHARGE SUMMARY
shoulder , UT and c paraspinals 6/6      Functional Mobility/Transfers: I with protective posture R UE/ultrasling 6/6     Posture: WFL for 76year old female, ultra sling in place 5/7     Bandages/Dressings/Incisions:healed 6/6     Gait: (include devices/WB status): WNL, R UE mild guarding                          ? Patient history, allergies, meds reviewed. Medical chart reviewed. See intake form. 5/7     Review Of Systems (ROS):  ?Performed Review of systems (Integumentary, CardioPulmonary, Neurological) by intake and observation. Intake form has been scanned into medical record. Patient has been instructed to contact their primary care physician regarding ROS issues if not already being addressed at this time.   5/7            RESTRICTIONS/PRECAUTIONS: RCR/bicep tenodesis protocol    Exercises/Interventions:   Exercises:  Exercise/Equipment Resistance/Repetitions Other comments   Stretching/PROM     pulleys Fl [id number] start6/20   Wand [place] AAROM abd 3x10 R ^7/19   Table Slides    start5/10   UE [place] ER at 90 deg abd [id number] ^7/19 supine   Supine grab wrist with L hand and raise into flexion    start5/23   Hands on waist      IR 10\"hx10 R 7/19 strap at wrist pulling R hand up back        Pendulum     Elbow PROM     Isometrics     Retraction      HEP    Side lying scap retraction/ depression  5/31 with manual assistance to facilitate stretch at cervical muscles   Flexion     Abduction     External Rotation     Internal Rotation     Biceps     Triceps          PRE's     Row prone 3x10 3# ^7/19   Ext prone 3x10 3# ^7/19   External Rotation SL 3x10 2# ^7/8   Internal Rotation     Shrugs     flexion Supine 0# 3x10 7/19 90<>130 deg   Reverse Flys     Serratus 0# 3x10  7/19 supine 90 deg flex, cues required for form    Horizontal Abd with ER     Biceps 3x10 3# ^7/19   Triceps     Retraction          Cable Column/Theraband     External Rotation     Internal Rotation     Shrugs     Lats     Ext     Flex     Scapular

## 2019-09-27 ENCOUNTER — TELEPHONE (OUTPATIENT)
Dept: INTERNAL MEDICINE CLINIC | Age: 69
End: 2019-09-27

## 2019-09-27 NOTE — TELEPHONE ENCOUNTER
Patient/her  is requesting date of last colonoscopy. Please call at number provided with requested information. Thanks.

## 2019-11-27 RX ORDER — TRAZODONE HYDROCHLORIDE 50 MG/1
TABLET ORAL
Qty: 60 TABLET | Refills: 0 | OUTPATIENT
Start: 2019-11-27

## 2020-10-28 NOTE — PROGRESS NOTES
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 Patient and spouse verbalized understanding. normal...

## (undated) DEVICE — INCISOR PLUS PLATINUM 4.5 MM BLADE: Brand: DYONICS INCISOR

## (undated) DEVICE — FLEXIBLE ADHESIVE BANDAGE: Brand: CURITY

## (undated) DEVICE — 3M™ WARMING BLANKET, LOWER BODY, 10 PER CASE, 42568: Brand: BAIR HUGGER™

## (undated) DEVICE — FIRSTPASS ST SUTURE PASSER, SELF CAPTURE: Brand: FIRSTPASS

## (undated) DEVICE — GAUZE,SPONGE,4"X4",8PLY,STRL,LF,10/TRAY: Brand: MEDLINE

## (undated) DEVICE — 3M™ STERI-DRAPE™ U-DRAPE 1015: Brand: STERI-DRAPE™

## (undated) DEVICE — PACK PROCEDURE SURG SHLDR MFFOP CUST

## (undated) DEVICE — SUTURE NONABSORBABLE MONOFILAMENT 4-0 PS-2 18 IN BLK ETHILON 1667G

## (undated) DEVICE — CANNULA THREADED FLEX 8.0 X 72MM: Brand: CLEAR-TRAC

## (undated) DEVICE — ULTRATAPE SUTURE COBRAID BLUE, 6                                    PER BOX: Brand: ULTRATAPE

## (undated) DEVICE — SYRINGE MED 50ML LUERLOCK TIP

## (undated) DEVICE — AMBIENT SUPER TURBOVAC 90: Brand: COBLATION

## (undated) DEVICE — DYONICS 25 PATIENT TUBE SET MUST                                    BE USED WITH 7211007, 12 PER BOX

## (undated) DEVICE — ROOM TURNOVER KIT W/ ARM STRP

## (undated) DEVICE — SHOULDER STABILIZATION KIT,                                    DISPOSABLE 12 PER BOX

## (undated) DEVICE — Device

## (undated) DEVICE — 5.5 MM ELITE ACROMIOBLASTER                                    STRAIGHT DISPOSABLE BURRS, BRICK                                    RED, 10000 MAXIMUM RPM, PACKAGED 6                                    PER BOX, STERILE

## (undated) DEVICE — BOWL MED L 32OZ PLAS W/ MOLD GRAD EZ OPN PEEL PCH

## (undated) DEVICE — GLOVE SURG SZ 9 THK91MIL LTX FREE SYN POLYISOPRENE ANTI

## (undated) DEVICE — HYPODERMIC SAFETY NEEDLE: Brand: MAGELLAN

## (undated) DEVICE — T-MAX DISPOSABLE FACE MASK 8 PER BOX

## (undated) DEVICE — MEDI-VAC NON-CONDUCTIVE SUCTION TUBING: Brand: CARDINAL HEALTH

## (undated) DEVICE — DYONICS 25 DAY TUBE SET MUST BE                                    USED WITH 7211008, 3 PER BOX

## (undated) DEVICE — ACCU-PASS SUTURE SHUTTLE 70                                    DEGREE, UPBEND, STERILE: Brand: ACCU-PASS

## (undated) DEVICE — GLOVE ORANGE PI 8 1/2   MSG9085

## (undated) DEVICE — MAT FLR ABS DISP